# Patient Record
Sex: FEMALE | Race: WHITE | NOT HISPANIC OR LATINO | Employment: OTHER | URBAN - NONMETROPOLITAN AREA
[De-identification: names, ages, dates, MRNs, and addresses within clinical notes are randomized per-mention and may not be internally consistent; named-entity substitution may affect disease eponyms.]

---

## 2017-01-30 ENCOUNTER — OFFICE VISIT (OUTPATIENT)
Dept: PAIN MEDICINE | Facility: CLINIC | Age: 52
End: 2017-01-30

## 2017-01-30 VITALS
HEIGHT: 64 IN | WEIGHT: 220.6 LBS | DIASTOLIC BLOOD PRESSURE: 80 MMHG | SYSTOLIC BLOOD PRESSURE: 140 MMHG | BODY MASS INDEX: 37.66 KG/M2

## 2017-01-30 DIAGNOSIS — M47.817 LUMBOSACRAL SPONDYLOSIS WITHOUT MYELOPATHY: Primary | ICD-10-CM

## 2017-01-30 DIAGNOSIS — M79.18 MYOFASCIAL PAIN: ICD-10-CM

## 2017-01-30 DIAGNOSIS — M54.16 LUMBAR RADICULOPATHY: ICD-10-CM

## 2017-01-30 PROCEDURE — 99213 OFFICE O/P EST LOW 20 MIN: CPT | Performed by: PAIN MEDICINE

## 2017-01-30 RX ORDER — GABAPENTIN 300 MG/1
300 CAPSULE ORAL 3 TIMES DAILY
Qty: 90 CAPSULE | Refills: 2 | Status: SHIPPED | OUTPATIENT
Start: 2017-01-30 | End: 2019-06-11

## 2017-01-30 RX ORDER — MELOXICAM 15 MG/1
15 TABLET ORAL DAILY
Qty: 30 TABLET | Refills: 2 | Status: SHIPPED | OUTPATIENT
Start: 2017-01-30 | End: 2017-03-01

## 2017-01-30 NOTE — PROGRESS NOTES
Kalyn Gilliland is a 51 y.o. female.   1965    HPI:   Location: lower back and right leg  Quality: aching and dull  Severity: 3-4/10  Timing: constant  Alleviating: pain medication  Aggravating: increased activity    Pt missed appt due to weather.  She has weaned herself off of opioid.  She determined it was effective, but she feels better without.  Taking ibuprofen 800mg q8h.      The following portions of the patient's history were reviewed by me and updated as appropriate: allergies, current medications, past family history, past medical history, past social history, past surgical history and problem list.    Past Medical History   Diagnosis Date   • Arthropathy of lumbar facet joint    • Cervical spondylosis without myelopathy    • Chronic cholecystitis    • Long term use of drug    • Lumbosacral radiculitis    • Myofascial pain    • Sebaceous cyst    • Trochanteric bursitis        Social History     Social History   • Marital status: Single     Spouse name: N/A   • Number of children: N/A   • Years of education: N/A     Occupational History   • Not on file.     Social History Main Topics   • Smoking status: Current Every Day Smoker   • Smokeless tobacco: Not on file   • Alcohol use No   • Drug use: Not on file   • Sexual activity: Not on file     Other Topics Concern   • Not on file     Social History Narrative       Family History   Problem Relation Age of Onset   • Cancer Other    • Diabetes Other    • Heart disease Other    • Hyperlipidemia Other    • Hypertension Other    • Thyroid disease Other    • Other Other      Headaches         Current Outpatient Prescriptions:   •  citalopram (CeleXA) 40 MG tablet, Take 40 mg by mouth daily., Disp: , Rfl:   •  gabapentin (NEURONTIN) 300 MG capsule, Take 1 capsule by mouth 3 (Three) Times a Day., Disp: 180 capsule, Rfl: 2  •  guaiFENesin (MUCINEX) 600 MG 12 hr tablet, Take 600 mg by mouth daily., Disp: , Rfl:   •  hydrochlorothiazide (MICROZIDE) 12.5 MG  capsule, Take 12.5 mg by mouth daily., Disp: , Rfl:   •  IBUPROFEN IB PO, Take 1 tablet by mouth 3 (three) times a day as needed., Disp: , Rfl:   •  lisinopril (PRINIVIL,ZESTRIL) 20 MG tablet, Take 20 mg by mouth daily., Disp: , Rfl:   •  loratadine (CLARITIN) 10 MG tablet, Take 10 mg by mouth daily., Disp: , Rfl:   •  morphine (MS CONTIN) 15 MG 12 hr tablet, Take 15 mg by mouth every 12 (twelve) hours., Disp: , Rfl:   •  morphine (MS CONTIN) 15 MG 12 hr tablet, Take 1 tablet by mouth 2 (two) times a day., Disp: 60 tablet, Rfl: 0  •  morphine (MS CONTIN) 15 MG 12 hr tablet, Take 1 tablet by mouth 2 (two) times a day., Disp: 60 tablet, Rfl: 0  •  omeprazole (PRILOSEC) 40 MG capsule, Take 40 mg by mouth daily., Disp: , Rfl:   •  amphetamine-dextroamphetamine (ADDERALL) 10 MG tablet, Take 10 mg by mouth daily., Disp: , Rfl:     Allergies   Allergen Reactions   • Erythromycin Base Coated [Erythromycin]          Review of Systems   Musculoskeletal: Positive for back pain.        R.leg       10 system review of systems was reviewed and negative except for above.    Physical Exam   Constitutional: She appears well-developed and well-nourished. No distress.   Musculoskeletal:        Cervical back: She exhibits decreased range of motion (bilateral facet loading.).        Lumbar back: She exhibits decreased range of motion (ext limited to <5 deg with facet loading) and tenderness.   Neurological: She is alert.   Psychiatric: She has a normal mood and affect. Her behavior is normal. Judgment normal.       Kalyn was seen today for back pain and extremity pain.    Diagnoses and all orders for this visit:    Lumbosacral spondylosis without myelopathy  -     IR Facet Block Lumbar Sacral Spine First Level; Future  -     IR Facet Block Lumbar Sacral Spine Second Level; Future    Lumbar radiculopathy    Myofascial pain        Medication: Change ibuprofen to mobic. Refill gabapentin.    Interventional:  I have discussed the risks and  benefits of the procedure with the patient.  and Patient is a candidate for radiofrequency ablation if the planned procedure is diagnostic.    Rehab: none at this time. Pt has difficulty making PT appts due to living far from the appts.     Behavioral: No aberrant behavior noted. [facility] Report #[id number]  was reviewed and is consistent with stated history    Urine drug screen None at this time          This document has been electronically signed by Ramón Booker MD on January 30, 2017 10:59 AM

## 2017-02-16 ENCOUNTER — HOSPITAL ENCOUNTER (OUTPATIENT)
Dept: INTERVENTIONAL RADIOLOGY/VASCULAR | Facility: HOSPITAL | Age: 52
Discharge: HOME | End: 2017-02-16
Attending: PAIN MEDICINE

## 2017-02-16 ENCOUNTER — HOSPITAL ENCOUNTER (OUTPATIENT)
Dept: INTERVENTIONAL RADIOLOGY/VASCULAR | Facility: HOSPITAL | Age: 52
Discharge: HOME | End: 2017-02-16
Attending: PAIN MEDICINE | Admitting: PAIN MEDICINE

## 2017-02-16 VITALS
DIASTOLIC BLOOD PRESSURE: 76 MMHG | RESPIRATION RATE: 20 BRPM | OXYGEN SATURATION: 96 % | SYSTOLIC BLOOD PRESSURE: 153 MMHG | HEART RATE: 78 BPM

## 2017-02-16 DIAGNOSIS — M47.817 LUMBOSACRAL SPONDYLOSIS WITHOUT MYELOPATHY: ICD-10-CM

## 2017-02-16 PROCEDURE — 25010000002 METHYLPREDNISOLONE PER 40 MG: Performed by: PAIN MEDICINE

## 2017-02-16 PROCEDURE — 64494 INJ PARAVERT F JNT L/S 2 LEV: CPT | Performed by: PAIN MEDICINE

## 2017-02-16 PROCEDURE — 64493 INJ PARAVERT F JNT L/S 1 LEV: CPT | Performed by: PAIN MEDICINE

## 2017-02-16 RX ORDER — LIDOCAINE HYDROCHLORIDE 20 MG/ML
INJECTION, SOLUTION INFILTRATION; PERINEURAL
Status: COMPLETED | OUTPATIENT
Start: 2017-02-16 | End: 2017-02-16

## 2017-02-16 RX ORDER — METHYLPREDNISOLONE ACETATE 40 MG/ML
INJECTION, SUSPENSION INTRA-ARTICULAR; INTRALESIONAL; INTRAMUSCULAR; SOFT TISSUE
Status: COMPLETED | OUTPATIENT
Start: 2017-02-16 | End: 2017-02-16

## 2017-02-16 RX ORDER — BUPIVACAINE HYDROCHLORIDE 5 MG/ML
INJECTION, SOLUTION EPIDURAL; INTRACAUDAL
Status: COMPLETED | OUTPATIENT
Start: 2017-02-16 | End: 2017-02-16

## 2017-02-16 RX ADMIN — METHYLPREDNISOLONE ACETATE 40 MG: 40 INJECTION, SUSPENSION INTRA-ARTICULAR; INTRALESIONAL; INTRAMUSCULAR; SOFT TISSUE at 13:06

## 2017-02-16 RX ADMIN — BUPIVACAINE HYDROCHLORIDE 4 ML: 5 INJECTION, SOLUTION EPIDURAL; INTRACAUDAL; PERINEURAL at 13:05

## 2017-02-16 RX ADMIN — LIDOCAINE HYDROCHLORIDE 4 ML: 20 INJECTION, SOLUTION INFILTRATION; PERINEURAL at 13:04

## 2017-03-28 ENCOUNTER — OFFICE VISIT (OUTPATIENT)
Dept: PAIN MEDICINE | Facility: CLINIC | Age: 52
End: 2017-03-28

## 2017-03-28 VITALS
BODY MASS INDEX: 37.56 KG/M2 | WEIGHT: 220 LBS | HEIGHT: 64 IN | DIASTOLIC BLOOD PRESSURE: 90 MMHG | SYSTOLIC BLOOD PRESSURE: 138 MMHG

## 2017-03-28 DIAGNOSIS — M79.18 MYOFASCIAL PAIN: ICD-10-CM

## 2017-03-28 DIAGNOSIS — M47.817 LUMBOSACRAL SPONDYLOSIS WITHOUT MYELOPATHY: Primary | ICD-10-CM

## 2017-03-28 DIAGNOSIS — M54.16 LUMBAR RADICULOPATHY: ICD-10-CM

## 2017-03-28 PROCEDURE — 99213 OFFICE O/P EST LOW 20 MIN: CPT | Performed by: PAIN MEDICINE

## 2017-03-28 RX ORDER — MELOXICAM 15 MG/1
15 TABLET ORAL DAILY
Qty: 30 TABLET | Refills: 3 | Status: SHIPPED | OUTPATIENT
Start: 2017-03-28 | End: 2017-07-21 | Stop reason: SDUPTHER

## 2017-03-28 RX ORDER — MELOXICAM 15 MG/1
15 TABLET ORAL DAILY
COMMUNITY
End: 2017-03-28 | Stop reason: SDUPTHER

## 2017-03-28 NOTE — PROGRESS NOTES
Kalyn Gilliland is a 52 y.o. female.   1965    HPI:   Location: lower back and right leg  Quality: aching and dull  Severity: 4/10  Timing: constant  Alleviating: pain medication  Aggravating: increased activity      lmbb's did not provide much relief.  Would like to try something else.    The following portions of the patient's history were reviewed by me and updated as appropriate: allergies, current medications, past family history, past medical history, past social history, past surgical history and problem list.    Past Medical History:   Diagnosis Date   • Arthropathy of lumbar facet joint    • Cervical spondylosis without myelopathy    • Chronic cholecystitis    • Long term use of drug    • Lumbosacral radiculitis    • Myofascial pain    • Sebaceous cyst    • Trochanteric bursitis        Social History     Social History   • Marital status: Single     Spouse name: N/A   • Number of children: N/A   • Years of education: N/A     Occupational History   • Not on file.     Social History Main Topics   • Smoking status: Current Every Day Smoker     Types: Cigarettes   • Smokeless tobacco: Not on file   • Alcohol use No   • Drug use: Not on file   • Sexual activity: Not on file     Other Topics Concern   • Not on file     Social History Narrative       Family History   Problem Relation Age of Onset   • Cancer Other    • Diabetes Other    • Heart disease Other    • Hyperlipidemia Other    • Hypertension Other    • Thyroid disease Other    • Other Other      Headaches         Current Outpatient Prescriptions:   •  citalopram (CeleXA) 40 MG tablet, Take 40 mg by mouth daily., Disp: , Rfl:   •  gabapentin (NEURONTIN) 300 MG capsule, Take 1 capsule by mouth 3 (Three) Times a Day., Disp: 90 capsule, Rfl: 2  •  hydrochlorothiazide (MICROZIDE) 12.5 MG capsule, Take 12.5 mg by mouth daily., Disp: , Rfl:   •  lisinopril (PRINIVIL,ZESTRIL) 20 MG tablet, Take 20 mg by mouth daily., Disp: , Rfl:   •  loratadine  (CLARITIN) 10 MG tablet, Take 10 mg by mouth daily., Disp: , Rfl:   •  meloxicam (MOBIC) 15 MG tablet, Take 1 tablet by mouth Daily., Disp: 30 tablet, Rfl: 3    Allergies   Allergen Reactions   • Erythromycin Base Coated [Erythromycin]          Review of Systems   Musculoskeletal: Positive for back pain (lower).        Right leg pain     10 system review of systems was reviewed and negative except for above.    Physical Exam   Constitutional: She appears well-developed and well-nourished. No distress.   Neurological: She is alert. She has normal strength.   Mod slr on right   Psychiatric: She has a normal mood and affect. Her behavior is normal. Judgment normal.       Kalyn was seen today for back pain and leg pain.    Diagnoses and all orders for this visit:    Lumbosacral spondylosis without myelopathy    Lumbar radiculopathy  -     IR epidural block injection lumbar spine; Future    Myofascial pain    Other orders  -     meloxicam (MOBIC) 15 MG tablet; Take 1 tablet by mouth Daily.        Medication: None at this time .  Mobic helps.  Will continue.    Interventional:  I have discussed the risks and benefits of the procedure with the patient.  caudal skip    Rehab: none at this time    Behavioral: No aberrant behavior noted. [facility] Report #[id number]  was reviewed and is consistent with stated history    Urine drug screen None at this time          This document has been electronically signed by Ramón Booker MD on March 28, 2017 11:56 AM

## 2017-04-17 ENCOUNTER — APPOINTMENT (OUTPATIENT)
Dept: INTERVENTIONAL RADIOLOGY/VASCULAR | Facility: HOSPITAL | Age: 52
End: 2017-04-17
Attending: PAIN MEDICINE

## 2017-06-26 ENCOUNTER — OFFICE VISIT (OUTPATIENT)
Dept: PAIN MEDICINE | Facility: CLINIC | Age: 52
End: 2017-06-26

## 2017-06-26 ENCOUNTER — APPOINTMENT (OUTPATIENT)
Dept: LAB | Facility: HOSPITAL | Age: 52
End: 2017-06-26

## 2017-06-26 VITALS
WEIGHT: 224.9 LBS | HEIGHT: 64 IN | SYSTOLIC BLOOD PRESSURE: 150 MMHG | BODY MASS INDEX: 38.39 KG/M2 | DIASTOLIC BLOOD PRESSURE: 90 MMHG

## 2017-06-26 DIAGNOSIS — M47.812 CERVICAL SPONDYLOSIS WITHOUT MYELOPATHY: ICD-10-CM

## 2017-06-26 DIAGNOSIS — Z98.890 HISTORY OF BACK SURGERY: ICD-10-CM

## 2017-06-26 DIAGNOSIS — M51.36 DDD (DEGENERATIVE DISC DISEASE), LUMBAR: Primary | ICD-10-CM

## 2017-06-26 DIAGNOSIS — Z79.899 HIGH RISK MEDICATIONS (NOT ANTICOAGULANTS) LONG-TERM USE: ICD-10-CM

## 2017-06-26 DIAGNOSIS — M79.18 MYOFACIAL MUSCLE PAIN: ICD-10-CM

## 2017-06-26 DIAGNOSIS — M54.2 CERVICALGIA: ICD-10-CM

## 2017-06-26 DIAGNOSIS — M47.817 LUMBOSACRAL SPONDYLOSIS WITHOUT MYELOPATHY: ICD-10-CM

## 2017-06-26 PROCEDURE — 99214 OFFICE O/P EST MOD 30 MIN: CPT | Performed by: PAIN MEDICINE

## 2017-06-26 PROCEDURE — G0481 DRUG TEST DEF 8-14 CLASSES: HCPCS | Performed by: NURSE PRACTITIONER

## 2017-06-26 PROCEDURE — 80307 DRUG TEST PRSMV CHEM ANLYZR: CPT | Performed by: NURSE PRACTITIONER

## 2017-06-26 RX ORDER — CYCLOBENZAPRINE HCL 10 MG
10 TABLET ORAL DAILY
Qty: 21 TABLET | Refills: 1 | Status: SHIPPED | OUTPATIENT
Start: 2017-06-26 | End: 2017-07-17

## 2017-06-26 NOTE — PROGRESS NOTES
"Kalyn Gilliland is a 52 y.o. female.   1965    HPI:   Location: lower back and right leg  Quality: aching and dull  Severity: 4/10  Timing: constant  Alleviating: pain medication  Aggravating: increased activity      Pt with return visit since March 2017. Pt with history of lower back pain with right leg pain. Pt had Dr. Roman ([facility]) Diskectomy 2014. Pt had seen pain management in past, opiate and injections. Pt had \"weened\" herself opiate medications 1/2017, missed appt and followed up with Dr. AQUILINO whiting. LMBB x 1 with less than 50% effective for 1 week. Was put on Mobic. Pt could not cover the LESI ordered in March. Pt has had Norco in past.       The following portions of the patient's history were reviewed by me and updated as appropriate: allergies, current medications, past family history, past medical history, past social history, past surgical history and problem list.    Past Medical History:   Diagnosis Date   • Arthropathy of lumbar facet joint    • Cervical spondylosis without myelopathy    • Chronic cholecystitis    • Long term use of drug    • Lumbosacral radiculitis    • Myofascial pain    • Sebaceous cyst    • Trochanteric bursitis        Social History     Social History   • Marital status: Single     Spouse name: N/A   • Number of children: N/A   • Years of education: N/A     Occupational History   • Not on file.     Social History Main Topics   • Smoking status: Current Every Day Smoker     Types: Cigarettes   • Smokeless tobacco: Never Used   • Alcohol use No   • Drug use: Not on file   • Sexual activity: Not on file     Other Topics Concern   • Not on file     Social History Narrative       Family History   Problem Relation Age of Onset   • Cancer Other    • Diabetes Other    • Heart disease Other    • Hyperlipidemia Other    • Hypertension Other    • Thyroid disease Other    • Other Other      Headaches         Current Outpatient Prescriptions:   •  citalopram (CeleXA) 40 MG " tablet, Take 40 mg by mouth daily., Disp: , Rfl:   •  gabapentin (NEURONTIN) 300 MG capsule, Take 1 capsule by mouth 3 (Three) Times a Day., Disp: 90 capsule, Rfl: 2  •  hydrochlorothiazide (MICROZIDE) 12.5 MG capsule, Take 12.5 mg by mouth daily., Disp: , Rfl:   •  lisinopril (PRINIVIL,ZESTRIL) 20 MG tablet, Take 20 mg by mouth daily., Disp: , Rfl:   •  loratadine (CLARITIN) 10 MG tablet, Take 10 mg by mouth daily., Disp: , Rfl:   •  meloxicam (MOBIC) 15 MG tablet, Take 1 tablet by mouth Daily., Disp: 30 tablet, Rfl: 3  •  cyclobenzaprine (FLEXERIL) 10 MG tablet, Take 1 tablet by mouth Daily for 21 days., Disp: 21 tablet, Rfl: 1    Allergies   Allergen Reactions   • Erythromycin Base Coated [Erythromycin]        Review of Systems   Musculoskeletal: Positive for back pain.        R.leg     All other systems reviewed and are negative.    All systems reviewed and negative except for above.    Physical Exam   Constitutional: She is oriented to person, place, and time. She appears well-developed and well-nourished. No distress.   Neck: Spinous process tenderness and muscular tenderness (  BPSM TTP) present. Decreased range of motion present.   Cardiovascular: Normal rate.    Pulmonary/Chest: Effort normal and breath sounds normal. No respiratory distress.   Abdominal: Soft.   Musculoskeletal:        Cervical back: She exhibits decreased range of motion ( limited flex and ext with [place] ) and tenderness (BPSM TTP ).        Lumbar back: She exhibits decreased range of motion (Flex 60 deg and 10 deg ext with [place]  ) and tenderness ( midline and TTP ).   Neurological: She is alert and oriented to person, place, and time. Gait ( cane) abnormal. Coordination normal.   Reflex Scores:       Tricep reflexes are 2+ on the right side and 2+ on the left side.       Bicep reflexes are 2+ on the right side and 2+ on the left side.       Brachioradialis reflexes are 2+ on the right side and 2+ on the left side.       Patellar  reflexes are 2+ on the right side and 2+ on the left side.       Achilles reflexes are 1+ on the right side and 1+ on the left side.  Skin: Skin is warm and dry.   Psychiatric: She has a normal mood and affect. Her behavior is normal. Judgment normal. She expresses no homicidal and no suicidal ideation.   Nursing note and vitals reviewed.      Kalyn was seen today for back pain and pain.    Diagnoses and all orders for this visit:    DDD (degenerative disc disease), lumbar    Lumbosacral spondylosis without myelopathy    History of back surgery    Cervicalgia    Cervical spondylosis without myelopathy    High risk medications (not anticoagulants) long-term use  -     ToxASSURE Select 13 (MW)    Myofacial muscle pain    Other orders  -     cyclobenzaprine (FLEXERIL) 10 MG tablet; Take 1 tablet by mouth Daily for 21 days.        Medication: Patient reports no negative side effects, Patient reports appropriate usage and storage habits and Patient's opioid provides enough reflief to be more active and perform activities of daily living with less discomfort. Norco 7.5mg q day. Discussed case with Real Booker, opiate medication refilled ×2 hand written scripts. Refilled flexeril 10mg q daily- NO SE noted.       Interventional: We will order norco 7.5mg q day (no SE effects reported via patient) and Flexeril 10mg q hs.  Pt could not cover the 'LESI ordered in march'. Pt wishes to discuss opiate medications with this visit.     Rehab: walks with cane for mobility.     Behavioral: No aberrant behavior noted. [facility] Report # [id number]  was reviewed and is consistent with stated history    Urine drug screen Ordered today to test for drugs of abuse and prescribed medications           This document has been electronically signed by YEIMY Mccullough on June 26, 2017 11:13 AM          This document has been electronically signed by YEIMY Mccullough on June 26, 2017 11:13 AM

## 2017-07-04 LAB — CONV REPORT SUMMARY: NORMAL

## 2017-07-21 RX ORDER — MELOXICAM 15 MG/1
15 TABLET ORAL DAILY
Qty: 30 TABLET | Refills: 3 | Status: SHIPPED | OUTPATIENT
Start: 2017-07-21 | End: 2017-07-26 | Stop reason: SDUPTHER

## 2017-07-26 RX ORDER — MELOXICAM 15 MG/1
15 TABLET ORAL DAILY
Qty: 90 TABLET | Refills: 0 | Status: SHIPPED | OUTPATIENT
Start: 2017-07-26 | End: 2019-03-28

## 2017-09-08 ENCOUNTER — OFFICE VISIT (OUTPATIENT)
Dept: PAIN MEDICINE | Facility: CLINIC | Age: 52
End: 2017-09-08

## 2017-09-08 VITALS
BODY MASS INDEX: 38 KG/M2 | HEIGHT: 64 IN | WEIGHT: 222.6 LBS | DIASTOLIC BLOOD PRESSURE: 82 MMHG | SYSTOLIC BLOOD PRESSURE: 130 MMHG

## 2017-09-08 DIAGNOSIS — E66.01 MORBID OBESITY DUE TO EXCESS CALORIES (HCC): ICD-10-CM

## 2017-09-08 DIAGNOSIS — M51.36 DDD (DEGENERATIVE DISC DISEASE), LUMBAR: Primary | ICD-10-CM

## 2017-09-08 DIAGNOSIS — M47.812 CERVICAL SPONDYLOSIS WITHOUT MYELOPATHY: ICD-10-CM

## 2017-09-08 DIAGNOSIS — M54.2 CERVICALGIA: ICD-10-CM

## 2017-09-08 DIAGNOSIS — M47.817 LUMBOSACRAL SPONDYLOSIS WITHOUT MYELOPATHY: ICD-10-CM

## 2017-09-08 DIAGNOSIS — M79.18 MYOFACIAL MUSCLE PAIN: ICD-10-CM

## 2017-09-08 PROCEDURE — 99214 OFFICE O/P EST MOD 30 MIN: CPT | Performed by: PAIN MEDICINE

## 2017-09-08 NOTE — PROGRESS NOTES
Kalyn Gilliland is a 52 y.o. female.   1965    HPI:   Location: lower back and right leg  Quality: aching  Severity: 4/10  Timing: constant  Alleviating: pain medication  Aggravating: increased activity    Pt with no change in health status. Pt with chronic neck and back pain. Pt reports using motrin currently instead of mobic for GI issues. This has been fully explained to the patient NSAIDS side effects, who indicates understanding. Opiate medications provides enough relief for daily activity and ambulation. NO SE. Pt happy with pain management visit and regimen.         The following portions of the patient's history were reviewed by me and updated as appropriate: allergies, current medications, past family history, past medical history, past social history, past surgical history and problem list.    Past Medical History:   Diagnosis Date   • Arthropathy of lumbar facet joint    • Cervical spondylosis without myelopathy    • Chronic cholecystitis    • Long term use of drug    • Lumbosacral radiculitis    • Myofascial pain    • Sebaceous cyst    • Trochanteric bursitis        Social History     Social History   • Marital status: Single     Spouse name: N/A   • Number of children: N/A   • Years of education: N/A     Occupational History   • Not on file.     Social History Main Topics   • Smoking status: Current Every Day Smoker     Types: Cigarettes   • Smokeless tobacco: Never Used   • Alcohol use No   • Drug use: No   • Sexual activity: Defer     Other Topics Concern   • Not on file     Social History Narrative       Family History   Problem Relation Age of Onset   • Cancer Other    • Diabetes Other    • Heart disease Other    • Hyperlipidemia Other    • Hypertension Other    • Thyroid disease Other    • Other Other      Headaches         Current Outpatient Prescriptions:   •  citalopram (CeleXA) 40 MG tablet, Take 40 mg by mouth daily., Disp: , Rfl:   •  gabapentin (NEURONTIN) 300 MG capsule, Take 1  capsule by mouth 3 (Three) Times a Day., Disp: 90 capsule, Rfl: 2  •  hydrochlorothiazide (MICROZIDE) 12.5 MG capsule, Take 12.5 mg by mouth daily., Disp: , Rfl:   •  lisinopril (PRINIVIL,ZESTRIL) 20 MG tablet, Take 20 mg by mouth daily., Disp: , Rfl:   •  loratadine (CLARITIN) 10 MG tablet, Take 10 mg by mouth daily., Disp: , Rfl:   •  meloxicam (MOBIC) 15 MG tablet, Take 1 tablet by mouth Daily., Disp: 90 tablet, Rfl: 0    Allergies   Allergen Reactions   • Erythromycin Base Coated [Erythromycin]        Review of Systems   Musculoskeletal: Positive for back pain (lower).        Right leg pain     All other systems reviewed and are negative.    All systems reviewed and negative except for above.    Physical Exam   Constitutional: She is oriented to person, place, and time. She appears well-developed and well-nourished. No distress.   Neck: Spinous process tenderness and muscular tenderness (  BPSM TTP) present. Decreased range of motion present.   Cardiovascular: Normal rate.    Pulmonary/Chest: Effort normal. No respiratory distress.   Abdominal: Soft.   Musculoskeletal:        Cervical back: She exhibits decreased range of motion (ext wtih cervical facet loading) and tenderness (BPSM TTP  ).        Lumbar back: She exhibits decreased range of motion (Flex < 45 deg and 15 deg with right> left facet loading ) and tenderness ( midline and right si joint TTp).   Neurological: She is alert and oriented to person, place, and time. Gait ( cane ) abnormal. Coordination normal.   Reflex Scores:       Tricep reflexes are 2+ on the right side and 2+ on the left side.       Bicep reflexes are 2+ on the right side and 2+ on the left side.       Brachioradialis reflexes are 2+ on the right side and 2+ on the left side.       Patellar reflexes are 2+ on the right side and 2+ on the left side.       Achilles reflexes are 1+ on the right side and 1+ on the left side.  Skin: Skin is warm and dry.   Psychiatric: She has a normal  mood and affect. Her behavior is normal. Judgment normal. She expresses no homicidal and no suicidal ideation. She expresses no suicidal plans and no homicidal plans.   Nursing note and vitals reviewed.      Kalyn was seen today for back pain and leg pain.    Diagnoses and all orders for this visit:    DDD (degenerative disc disease), lumbar    Lumbosacral spondylosis without myelopathy    Cervicalgia    Cervical spondylosis without myelopathy    Myofacial muscle pain    Morbid obesity due to excess calories        Medication: Patient reports no negative side effects, Patient reports appropriate usage and storage habits and Patient's opioid provides enough reflief to be more active and perform activities of daily living with less discomfort. Norco 7.5mg q day. Discussed case with Real Booker, opiate medication refilled ×2 hand written scripts. I have refilled Flexeril. Pt decides to use OTC motrin until next visit- Mobic refill occ.       Interventional:  Lumbar injections cost prohibitive at this moment. I have discussed weight control and issues with current exam, current weight 222lb. OLY and any neurological impairments discussed with patient that may need of emergency evaluation.      Last 2 weights    09/08/17  1223   Weight: 222 lb 9.6 oz (101 kg)         Rehab: Walks with cane.     Behavioral: No aberrant behavior noted. [facility] Report # [id number]  was reviewed and is consistent with stated history    Urine drug screen Reviewed from last visit and is appropriate          This document has been electronically signed by YEIMY Mccullough on September 8, 2017 1:06 PM          This document has been electronically signed by YEIMY Mccullough on September 8, 2017 1:06 PM

## 2017-11-07 ENCOUNTER — APPOINTMENT (OUTPATIENT)
Dept: LAB | Facility: HOSPITAL | Age: 52
End: 2017-11-07

## 2017-11-07 ENCOUNTER — OFFICE VISIT (OUTPATIENT)
Dept: PAIN MEDICINE | Facility: CLINIC | Age: 52
End: 2017-11-07

## 2017-11-07 VITALS
SYSTOLIC BLOOD PRESSURE: 130 MMHG | DIASTOLIC BLOOD PRESSURE: 88 MMHG | HEIGHT: 64 IN | BODY MASS INDEX: 37.47 KG/M2 | WEIGHT: 219.5 LBS

## 2017-11-07 DIAGNOSIS — M79.18 MYOFACIAL MUSCLE PAIN: ICD-10-CM

## 2017-11-07 DIAGNOSIS — Z79.899 HIGH RISK MEDICATIONS (NOT ANTICOAGULANTS) LONG-TERM USE: ICD-10-CM

## 2017-11-07 DIAGNOSIS — M54.2 CERVICALGIA: ICD-10-CM

## 2017-11-07 DIAGNOSIS — M47.817 LUMBOSACRAL SPONDYLOSIS WITHOUT MYELOPATHY: ICD-10-CM

## 2017-11-07 DIAGNOSIS — E66.01 MORBID OBESITY DUE TO EXCESS CALORIES (HCC): ICD-10-CM

## 2017-11-07 DIAGNOSIS — M51.36 DDD (DEGENERATIVE DISC DISEASE), LUMBAR: Primary | ICD-10-CM

## 2017-11-07 DIAGNOSIS — M47.812 CERVICAL SPONDYLOSIS WITHOUT MYELOPATHY: ICD-10-CM

## 2017-11-07 PROCEDURE — G0481 DRUG TEST DEF 8-14 CLASSES: HCPCS | Performed by: NURSE PRACTITIONER

## 2017-11-07 PROCEDURE — 99214 OFFICE O/P EST MOD 30 MIN: CPT | Performed by: PAIN MEDICINE

## 2017-11-07 PROCEDURE — 80307 DRUG TEST PRSMV CHEM ANLYZR: CPT | Performed by: NURSE PRACTITIONER

## 2017-11-07 NOTE — PROGRESS NOTES
"Kalyn Gilliland is a 52 y.o. female.   1965    HPI:   Location: lower back and right leg  Quality: aching  Severity: 4/10  Timing: constant  Alleviating: pain medication  Aggravating: increased activity    Pt reports \"has stopped using Nsaids personal choice- no gi issues- just want to give my system a break\"- cold weather causing more pain. Inquires about increasing pain during day- May increase dosage bid for temporary- but understands this is not long term. Opiate medications provides just enough relief for daily activity and ambulation. NO SE. Pt happy with pain management visit and regimen.         The following portions of the patient's history were reviewed by me and updated as appropriate: allergies, current medications, past family history, past medical history, past social history, past surgical history and problem list.    Past Medical History:   Diagnosis Date   • Arthropathy of lumbar facet joint    • Cervical spondylosis without myelopathy    • Chronic cholecystitis    • Long term use of drug    • Lumbosacral radiculitis    • Myofascial pain    • Sebaceous cyst    • Trochanteric bursitis        Social History     Social History   • Marital status: Single     Spouse name: N/A   • Number of children: N/A   • Years of education: N/A     Occupational History   • Not on file.     Social History Main Topics   • Smoking status: Current Every Day Smoker     Types: Cigarettes   • Smokeless tobacco: Never Used   • Alcohol use No   • Drug use: No   • Sexual activity: Defer     Other Topics Concern   • Not on file     Social History Narrative       Family History   Problem Relation Age of Onset   • Cancer Other    • Diabetes Other    • Heart disease Other    • Hyperlipidemia Other    • Hypertension Other    • Thyroid disease Other    • Other Other      Headaches         Current Outpatient Prescriptions:   •  citalopram (CeleXA) 40 MG tablet, Take 40 mg by mouth daily., Disp: , Rfl:   •  gabapentin " (NEURONTIN) 300 MG capsule, Take 1 capsule by mouth 3 (Three) Times a Day., Disp: 90 capsule, Rfl: 2  •  hydrochlorothiazide (MICROZIDE) 12.5 MG capsule, Take 12.5 mg by mouth daily., Disp: , Rfl:   •  lisinopril (PRINIVIL,ZESTRIL) 20 MG tablet, Take 20 mg by mouth daily., Disp: , Rfl:   •  loratadine (CLARITIN) 10 MG tablet, Take 10 mg by mouth daily., Disp: , Rfl:   •  meloxicam (MOBIC) 15 MG tablet, Take 1 tablet by mouth Daily., Disp: 90 tablet, Rfl: 0    Allergies   Allergen Reactions   • Erythromycin Base Coated [Erythromycin]        Review of Systems   Musculoskeletal: Positive for back pain (lower).        Right leg pain     All other systems reviewed and are negative.    All systems reviewed and negative except for above.    Physical Exam   Constitutional: She is oriented to person, place, and time. She appears well-developed and well-nourished. No distress.   Neck: Spinous process tenderness and muscular tenderness (  BPSM TTP) present. Decreased range of motion present.   Cardiovascular: Normal rate.    Pulmonary/Chest: Effort normal. No respiratory distress.   Musculoskeletal:        Cervical back: She exhibits decreased range of motion (mild ext limited with facet loading) and tenderness (muscle asher cervical ttp).        Lumbar back: She exhibits decreased range of motion (Flex < 60 deg and 15 deg ext wtih mild left facet loading  ) and tenderness (midline and left si joint).   Neurological: She is alert and oriented to person, place, and time. She displays no tremor. She displays no seizure activity. Gait ( cane ) abnormal. Coordination normal.   Reflex Scores:       Patellar reflexes are 2+ on the right side and 2+ on the left side.       Achilles reflexes are 1+ on the right side and 1+ on the left side.  Skin: Skin is warm and dry. No pallor.   Psychiatric: She has a normal mood and affect. Her behavior is normal. Judgment normal. She expresses no homicidal and no suicidal ideation. She expresses no  suicidal plans and no homicidal plans.   Nursing note and vitals reviewed.      Kalyn was seen today for back pain and leg pain.    Diagnoses and all orders for this visit:    DDD (degenerative disc disease), lumbar    Lumbosacral spondylosis without myelopathy    Cervical spondylosis without myelopathy    Cervicalgia    Myofacial muscle pain    Morbid obesity due to excess calories    High risk medications (not anticoagulants) long-term use  -     [employer] Select 13 (MW) - Urine, Clean Catch        Medication: Patient reports no negative side effects, Patient reports appropriate usage and storage habits and Patient's opioid provides enough relief to be more active and perform activities of daily living with less discomfort. will increase temporary norco 7.5mg bid. Discussed case with Real Booker, opiate medication refilled ×2 hand written scripts.      Interventional: OLY and any neurological impairments discussed with patient that may need of emergency evaluation.    Rehab: walking with cane for mobility.     Behavioral: No aberrant behavior noted. [facility] Report # [id number]  was reviewed and is consistent with stated history    Urine drug screen Ordered today to test for drugs of abuse and prescribed medications          This document has been electronically signed by YEIMY Mccullough on November 7, 2017 10:35 AM          This document has been electronically signed by YEIMY Mccullough on November 7, 2017 10:35 AM

## 2017-11-12 LAB — CONV REPORT SUMMARY: NORMAL

## 2017-12-20 ENCOUNTER — OFFICE VISIT (OUTPATIENT)
Dept: PAIN MEDICINE | Facility: CLINIC | Age: 52
End: 2017-12-20

## 2017-12-20 VITALS
WEIGHT: 226.3 LBS | SYSTOLIC BLOOD PRESSURE: 128 MMHG | BODY MASS INDEX: 38.64 KG/M2 | HEIGHT: 64 IN | DIASTOLIC BLOOD PRESSURE: 88 MMHG

## 2017-12-20 DIAGNOSIS — M54.2 CERVICALGIA: ICD-10-CM

## 2017-12-20 DIAGNOSIS — M54.16 LUMBAR RADICULOPATHY: ICD-10-CM

## 2017-12-20 DIAGNOSIS — M51.36 DDD (DEGENERATIVE DISC DISEASE), LUMBAR: Primary | ICD-10-CM

## 2017-12-20 DIAGNOSIS — E66.01 MORBIDLY OBESE (HCC): ICD-10-CM

## 2017-12-20 DIAGNOSIS — Z79.899 HIGH RISK MEDICATIONS (NOT ANTICOAGULANTS) LONG-TERM USE: ICD-10-CM

## 2017-12-20 PROCEDURE — 99214 OFFICE O/P EST MOD 30 MIN: CPT | Performed by: PAIN MEDICINE

## 2017-12-20 RX ORDER — HYDROCODONE BITARTRATE AND ACETAMINOPHEN 7.5; 325 MG/1; MG/1
1 TABLET ORAL 2 TIMES DAILY
Qty: 60 TABLET | Refills: 0 | Status: SHIPPED | OUTPATIENT
Start: 2017-12-20 | End: 2018-01-19

## 2017-12-20 NOTE — PROGRESS NOTES
Kalyn Gilliland is a 52 y.o. female.   1965    HPI:   Location: lower back and right leg  Quality: aching  Severity: 4/10  Timing: constant  Alleviating: pain medication  Aggravating: increased activity     Patient reports that the opioid medication still provides her good relief and allows more activity than she would have without the opioid medication.  She denies side effects.      The following portions of the patient's history were reviewed by me and updated as appropriate: allergies, current medications, past family history, past medical history, past social history, past surgical history and problem list.    Past Medical History:   Diagnosis Date   • Arthropathy of lumbar facet joint    • Cervical spondylosis without myelopathy    • Chronic cholecystitis    • Long term use of drug    • Lumbosacral radiculitis    • Myofascial pain    • Sebaceous cyst    • Trochanteric bursitis        Social History     Social History   • Marital status: Single     Spouse name: N/A   • Number of children: N/A   • Years of education: N/A     Occupational History   • Not on file.     Social History Main Topics   • Smoking status: Current Every Day Smoker     Types: Cigarettes   • Smokeless tobacco: Never Used   • Alcohol use No   • Drug use: No   • Sexual activity: Defer     Other Topics Concern   • Not on file     Social History Narrative       Family History   Problem Relation Age of Onset   • Cancer Other    • Diabetes Other    • Heart disease Other    • Hyperlipidemia Other    • Hypertension Other    • Thyroid disease Other    • Other Other      Headaches         Current Outpatient Prescriptions:   •  citalopram (CeleXA) 40 MG tablet, Take 40 mg by mouth daily., Disp: , Rfl:   •  gabapentin (NEURONTIN) 300 MG capsule, Take 1 capsule by mouth 3 (Three) Times a Day., Disp: 90 capsule, Rfl: 2  •  hydrochlorothiazide (MICROZIDE) 12.5 MG capsule, Take 12.5 mg by mouth daily., Disp: , Rfl:   •  lisinopril (PRINIVIL,ZESTRIL)  20 MG tablet, Take 20 mg by mouth daily., Disp: , Rfl:   •  loratadine (CLARITIN) 10 MG tablet, Take 10 mg by mouth daily., Disp: , Rfl:   •  meloxicam (MOBIC) 15 MG tablet, Take 1 tablet by mouth Daily., Disp: 90 tablet, Rfl: 0  •  HYDROcodone-acetaminophen (NORCO) 7.5-325 MG per tablet, Take 1 tablet by mouth 2 (Two) Times a Day for 30 days., Disp: 60 tablet, Rfl: 0    Allergies   Allergen Reactions   • Erythromycin Base Coated [Erythromycin]        Review of Systems   Musculoskeletal: Positive for back pain (lower).        Right leg pain     All other systems reviewed and are negative.    All systems reviewed and negative except for above.    Physical Exam   Constitutional: She appears well-developed and well-nourished. No distress.   Musculoskeletal:        Cervical back: She exhibits decreased range of motion (bilateral facet loading.).        Lumbar back: She exhibits decreased range of motion (ext limited to <5 deg with facet loading.  flexion to about 30 deg) and tenderness.   Neurological: She is alert.   Psychiatric: She has a normal mood and affect. Her behavior is normal. Judgment normal.       Kalyn was seen today for back pain and leg pain.    Diagnoses and all orders for this visit:    DDD (degenerative disc disease), lumbar    Lumbar radiculopathy    Cervicalgia    Morbidly obese    High risk medications (not anticoagulants) long-term use    Other orders  -     HYDROcodone-acetaminophen (NORCO) 7.5-325 MG per tablet; Take 1 tablet by mouth 2 (Two) Times a Day for 30 days.        Medication: Patient reports no negative side effects, Patient reports appropriate usage and storage habits, Patient's opioid provides enough relief to be more active and perform activities of daily living with less discomfort. and Refill opioid medication as above.   I explained that the pain clinic will be closing after the first of the year.  I let the pt know where I will be located.     Interventional: none at this time.   Candidate for follow up lmbb's.  Pt had issue with payment.      Rehab: Encouraged daily stretching and gentle rom exercises.    Behavioral: No aberrant behavior noted. [facility] Report #[id number]  was reviewed and is consistent with stated history    Urine drug screen Reviewed from last visit and is appropriate          This document has been electronically signed by Ramón Booker MD on December 20, 2017 10:32 AM

## 2018-03-08 ENCOUNTER — APPOINTMENT (OUTPATIENT)
Dept: ULTRASOUND IMAGING | Facility: HOSPITAL | Age: 53
End: 2018-03-08

## 2018-05-23 ENCOUNTER — TELEPHONE (OUTPATIENT)
Dept: GASTROENTEROLOGY | Facility: CLINIC | Age: 53
End: 2018-05-23

## 2018-05-23 NOTE — TELEPHONE ENCOUNTER
----- Message from Vivifred Valerio sent at 2018  3:02 PM EDT -----  Regarding: Returning call  Contact: [phone]   69 Pt returned call. Call her after 3:30 tomorrow   CALLED PATIENT LEFT MESSAGE TO CALL [phone]

## 2019-03-28 ENCOUNTER — OFFICE VISIT (OUTPATIENT)
Dept: GASTROENTEROLOGY | Facility: CLINIC | Age: 54
End: 2019-03-28

## 2019-03-28 VITALS
BODY MASS INDEX: 35 KG/M2 | HEIGHT: 64 IN | SYSTOLIC BLOOD PRESSURE: 120 MMHG | WEIGHT: 205.04 LBS | HEART RATE: 86 BPM | DIASTOLIC BLOOD PRESSURE: 62 MMHG

## 2019-03-28 DIAGNOSIS — Z12.11 ENCOUNTER FOR SCREENING FOR MALIGNANT NEOPLASM OF COLON: ICD-10-CM

## 2019-03-28 DIAGNOSIS — R10.13 EPIGASTRIC PAIN: Primary | ICD-10-CM

## 2019-03-28 PROCEDURE — 99214 OFFICE O/P EST MOD 30 MIN: CPT | Performed by: NURSE PRACTITIONER

## 2019-03-28 RX ORDER — OMEPRAZOLE 40 MG/1
CAPSULE, DELAYED RELEASE ORAL
COMMUNITY
Start: 2018-02-06 | End: 2019-07-02 | Stop reason: SDUPTHER

## 2019-03-28 RX ORDER — SUCRALFATE ORAL 1 G/10ML
1 SUSPENSION ORAL
Qty: 1260 ML | Refills: 3 | Status: SHIPPED | OUTPATIENT
Start: 2019-03-28 | End: 2019-04-18 | Stop reason: SDUPTHER

## 2019-03-28 RX ORDER — SODIUM, POTASSIUM,MAG SULFATES 17.5-3.13G
1 SOLUTION, RECONSTITUTED, ORAL ORAL EVERY 12 HOURS
Qty: 2 BOTTLE | Refills: 0 | Status: SHIPPED | OUTPATIENT
Start: 2019-03-28 | End: 2019-03-29 | Stop reason: ALTCHOICE

## 2019-03-28 RX ORDER — DEXTROSE AND SODIUM CHLORIDE 5; .45 G/100ML; G/100ML
30 INJECTION, SOLUTION INTRAVENOUS CONTINUOUS PRN
Status: CANCELLED | OUTPATIENT
Start: 2019-04-30

## 2019-03-28 NOTE — PROGRESS NOTES
Chief Complaint   Patient presents with   • Colon Cancer Screening     family hx colon cancer   • Abdominal Pain       Subjective    Kalyn Gilliland is a 54 y.o. female. she is here today for follow-up.  54-year-old female presents to discuss abdominal pain screening colonoscopy due to family history of colon cancer in maternal grandmother.  Reports abdominal pain is been ongoing for several years.  Been followed by pain management discontinued morphine and started on ibuprofen 800 mg twice daily for several years.  States she has upper abdominal pain anytime she eats and she has lost about 20 pounds in the last 3 months due to abdominal pain with eating.  Had her gallbladder out several years ago.  Denies any changes in her bowel habits reports occasional hemorrhoidal bleeding if she strains.  She has opioid-induced constipation due to chronic pain medication however controlled with dietary modifications and increase fiber.    Heartburn   She complains of abdominal pain, belching and heartburn (better with prilosec ). She reports no chest pain, no choking, no coughing, no dysphagia, no early satiety, no globus sensation, no hoarse voice, no nausea or no sore throat. The problem occurs frequently. Pertinent negatives include no fatigue. Risk factors include NSAIDs (800 mg bid for several years ). She has tried a PPI and a diet change (prilosec daily ) for the symptoms.     Plan; we will add Carafate before meals and bedtime have instructed patient this may worsen constipation she will discontinue if that becomes a problem.  Schedule patient for EGD and colonoscopy due to epigastric abdominal pain and initial screening colonoscopy.  Follow-up after test return office sooner if needed       The following portions of the patient's history were reviewed and updated as appropriate:   Past Medical History:   Diagnosis Date   • Arthropathy of lumbar facet joint    • Cervical spondylosis without myelopathy    • Chronic  cholecystitis    • Long term use of drug    • Lumbosacral radiculitis    • Myofascial pain    • Sebaceous cyst    • Trochanteric bursitis      Past Surgical History:   Procedure Laterality Date   • CYST REMOVAL  10/18/2015    EXC TR-EXT Benign+Hue 2.1-3 CM 39936 (Sebaceous cyst)    • CYSTOCELE REPAIR  10/18/2015    Repair Superficial Wound TR-EXT 2.5 < CM 22424 (Sebaceous cyst)      Family History   Problem Relation Age of Onset   • Cancer Other    • Diabetes Other    • Heart disease Other    • Hyperlipidemia Other    • Hypertension Other    • Thyroid disease Other    • Other Other         Headaches     OB History     No data available        Prior to Admission medications    Medication Sig Start Date End Date Taking? Authorizing Provider   citalopram (CeleXA) 40 MG tablet Take 40 mg by mouth daily.   Yes ProviderSanjay MD   gabapentin (NEURONTIN) 300 MG capsule Take 1 capsule by mouth 3 (Three) Times a Day. 1/30/17  Yes Ramón Booker MD   hydrochlorothiazide (MICROZIDE) 12.5 MG capsule Take 12.5 mg by mouth daily.   Yes ProviderSanjay MD   lisinopril (PRINIVIL,ZESTRIL) 20 MG tablet Take 20 mg by mouth daily.   Yes ProviderSanjay MD   loratadine (CLARITIN) 10 MG tablet Take 10 mg by mouth daily.   Yes ProviderSanjay MD   omeprazole (priLOSEC) 40 MG capsule TAKE 1 CAPSULE BY MOUTH EVERY DAY 2/6/18  Yes ProviderSanjay MD   meloxicam (MOBIC) 15 MG tablet Take 1 tablet by mouth Daily. 7/26/17 3/28/19  Ramón Booker MD     Allergies   Allergen Reactions   • Erythromycin Base Coated [Erythromycin]      Social History     Socioeconomic History   • Marital status: Single     Spouse name: Not on file   • Number of children: Not on file   • Years of education: Not on file   • Highest education level: Not on file   Tobacco Use   • Smoking status: Current Every Day Smoker     Types: Cigarettes   • Smokeless tobacco: Never Used   Substance and Sexual Activity   • Alcohol use: No   •  "Drug use: No   • Sexual activity: Defer       Review of Systems  Review of Systems   Constitutional: Negative for activity change, appetite change, chills, diaphoresis, fatigue, fever and unexpected weight change.   HENT: Negative for hoarse voice, sore throat and trouble swallowing.    Respiratory: Negative for cough, choking and shortness of breath.    Cardiovascular: Negative for chest pain.   Gastrointestinal: Positive for abdominal distention, abdominal pain and heartburn (better with prilosec ). Negative for anal bleeding, blood in stool, constipation, diarrhea, dysphagia, nausea, rectal pain and vomiting.   Musculoskeletal: Negative for arthralgias.   Skin: Negative for pallor.   Neurological: Negative for light-headedness.        /62 (BP Location: Left arm)   Pulse 86   Ht 162.6 cm (64\")   Wt 93 kg (205 lb 0.6 oz)   BMI 35.20 kg/m²     Objective    Physical Exam   Constitutional: She is oriented to person, place, and time. She appears well-developed and well-nourished. She is cooperative. No distress.   HENT:   Head: Normocephalic and atraumatic.   Neck: Normal range of motion. Neck supple. No thyromegaly present.   Cardiovascular: Normal rate, regular rhythm and normal heart sounds.   Pulmonary/Chest: Effort normal and breath sounds normal. She has no wheezes. She has no rhonchi. She has no rales.   Abdominal: Soft. Normal appearance and bowel sounds are normal. There is no hepatosplenomegaly. There is tenderness (very tender to palpation ) in the epigastric area. There is guarding. There is no rigidity. No hernia.   Lymphadenopathy:     She has no cervical adenopathy.   Neurological: She is alert and oriented to person, place, and time.   Skin: Skin is warm, dry and intact. No rash noted. No pallor.   Psychiatric: She has a normal mood and affect. Her speech is normal.     Office Visit on 11/07/2017   Component Date Value Ref Range Status   • Report Summary 11/07/2017 FINAL   Final    Comment: " ====================================================================  [employer] SELECT 13 (MW)  ====================================================================  Test                             Result       Flag       Units  Drug Present    Hydrocodone                    822                     ng/mg creat    Hydromorphone                  273                     ng/mg creat    Norhydrocodone                 563                     ng/mg creat     Sources of hydrocodone include scheduled prescription     medications. Hydromorphone and norhydrocodone are expected     metabolites of hydrocodone. Hydromorphone is also available as a     scheduled prescription medication.  ====================================================================  Test                      Result    Flag   Units      Ref Range    Creatinine              115              mg/dL      >=20  ====================================================================  Declared Medications:   Medication list was not                            provided.  ====================================================================  For clinical consultation, please call [phone].  ====================================================================     Assessment/Plan      1. Epigastric pain    2. Encounter for screening for malignant neoplasm of colon    .       Orders placed during this encounter include:  Orders Placed This Encounter   Procedures   • Follow Anesthesia Guidelines / Standing Orders     Standing Status:   Future       ESOPHAGOGASTRODUODENOSCOPY (N/A), COLONOSCOPY (N/A)    Review and/or summary of lab tests, radiology, procedures, medications. Review and summary of old records and obtaining of history. The risks and benefits of my recommendations, as well as other treatment options were discussed with the patient today. Questions were answered.    New Medications Ordered This Visit   Medications   • sucralfate (CARAFATE) 1 GM/10ML  suspension     Sig: Take 10 mL by mouth 4 (Four) Times a Day With Meals & at Bedtime.     Dispense:  1260 mL     Refill:  3   • sodium-potassium-magnesium sulfates (SUPREP BOWEL PREP KIT) 17.5-3.13-1.6 GM/177ML solution oral solution     Sig: Take 1 bottle by mouth Every 12 (Twelve) Hours.     Dispense:  2 bottle     Refill:  0       Follow-up: Return in about 4 weeks (around 4/25/2019).          This document has been electronically signed by YEIMY Bateman on March 28, 2019 3:04 PM             Results for orders placed or performed in visit on 11/07/17   [employer] 13 (MW) - Urine, Clean Catch   Result Value Ref Range    Report Summary FINAL    Results for orders placed or performed in visit on 06/26/17   [employer] 13 (MW)   Result Value Ref Range    Report Summary FINAL    Results for orders placed or performed during the hospital encounter of 09/07/16   Pain Management Profile (13 Drugs) Urine   Result Value Ref Range    Pain Management Drug Panel See Below     Codeine, Urine Not Detected     Morphine, Urine Present     6-acetylmorphine Not Detected     Oxycodone Screen, Urine Not Detected     Noroxycodone Not Detected     Oxymorphone UR Not Detected     Noroxymorphone Not Detected     Hydrocodone UR Not Detected     Norhydrocodone Not Detected     Hydromorphone Not Detected     Buprenorphine, Urine Not Detected     Norbuprenorphine Not Detected     Fentanyl, Urine Not Detected     Norfentanyl Not Detected     Normeperidine, Urine Not Detected     Tapentadol Not Detected     Tapentadol-o-Sulf Not Detected     Methadone Screen, Urine Not Detected     Propoxyphene Screen Not Detected     Tramadol Screen, Urine Not Detected     Amphetamine, Urine Qual Not Detected     Methamphetamine, Urine Not Detected     MDMA-Ecstasy Not Detected     MDA Not Detected     MDEA Not Detected     Phentermine Urine Not Detected     Benzoylecgonine, Urine Not Detected     Alprazolam Urine, Conf Not Detected      alpha-Hydroxyalprazolam, Quant, Urine Not Detected     Clonazepam Not Detected     7-Aminoclonazepam Urine Not Detected     Diazepam Urine, Qualitative Not Detected     Nordiazepam, Urine Not Detected     Oxazepam, urine Not Detected     Temazepam, urine Not Detected     Lorazepam Not Detected     Midazolam, Urine Not Detected     Zolpidem(Ambien) Urine Not Detected     Barbiturates Screen, Urine Not Detected     Ethyl Glucuronide Not Detected     Marijuana Metabolite Not Detected     Phencyclidine (PCP), Urine Not Detected     Carisoprodol, Serum Not Detected     Creatinine, Urine 39.1 20.0 - 400.0 mg/dL    Pain Management Drug Panel See Note     Methylphenidate Not Detected    Results for orders placed or performed during the hospital encounter of 02/17/16   Pain Management Profile (13 Drugs) Urine   Result Value Ref Range    Pain Management Drug Panel See Below     Codeine, Urine Not Detected     Morphine, Urine Present     6-acetylmorphine Not Detected     Oxycodone Screen, Urine Not Detected     Noroxycodone Not Detected     Oxymorphone UR Not Detected     Noroxymorphone Not Detected     Hydrocodone UR Not Detected     Norhydrocodone Not Detected     Hydromorphone Not Detected     Buprenorphine, Urine Not Detected     Norbuprenorphine Not Detected     Fentanyl, Urine Not Detected     Norfentanyl Not Detected     Normeperidine, Urine Not Detected     Tapentadol Not Detected     Tapentadol-o-Sulf Not Detected     Methadone Screen, Urine Not Detected     Propoxyphene Screen Not Detected     Tramadol Screen, Urine Not Detected     Amphetamine, Urine Qual Present     Methamphetamine, Urine Not Detected     MDMA-Ecstasy Not Detected     MDA Not Detected     MDEA Not Detected     Ritalinic Acid, Urine Not Detected     Phentermine Urine Not Detected     Benzoylecgonine, Urine Not Detected     Alprazolam Urine, Conf Not Detected     alpha-Hydroxyalprazolam, Quant, Urine Not Detected     Clonazepam Not Detected      7-Aminoclonazepam Urine Not Detected     Diazepam Urine, Qualitative Not Detected     Nordiazepam, Urine Not Detected     Oxazepam, urine Not Detected     Temazepam, urine Not Detected     Lorazepam Not Detected     Midazolam, Urine Not Detected     Zolpidem(Ambien) Urine Not Detected     Barbiturates Screen, Urine Not Detected     Ethyl Glucuronide Not Detected     Marijuana Metabolite Not Detected     Phencyclidine (PCP), Urine Not Detected     Carisoprodol, Serum Not Detected     Creatinine, Urine 129.9 20.0 - 400.0 mg/dL    Pain Management Drug Panel See Note    Results for orders placed or performed during the hospital encounter of 09/21/15   Converted Surgical Pathology   Result Value Ref Range    Spec Descr 1 SPECIMEN(S): A LESION, LEFT FOREARM    Results for orders placed or performed during the hospital encounter of 07/14/15   Urinalysis, Microscopic only   Result Value Ref Range    WBC, UA 1-2 0-5,0,RARE,1-2,2-4,3-5  /HPF    RBC, UA 0-2 0-2,0,RARE  /HPF    Bacteria, UA 1+ (A) 0,TRACE  /HPF    Mucus, UA 1+     Epithelial Cells, UA 1+ Squamous  /HPF     *Note: Due to a large number of results and/or encounters for the requested time period, some results have not been displayed. A complete set of results can be found in Results Review.

## 2019-03-28 NOTE — PATIENT INSTRUCTIONS
Abdominal Pain, Adult  Abdominal pain can be caused by many things. Often, abdominal pain is not serious and it gets better with no treatment or by being treated at home. However, sometimes abdominal pain is serious. Your health care provider will do a medical history and a physical exam to try to determine the cause of your abdominal pain.  Follow these instructions at home:  · Take over-the-counter and prescription medicines only as told by your health care provider. Do not take a laxative unless told by your health care provider.  · Drink enough fluid to keep your urine clear or pale yellow.  · Watch your condition for any changes.  · Keep all follow-up visits as told by your health care provider. This is important.  Contact a health care provider if:  · Your abdominal pain changes or gets worse.  · You are not hungry or you lose weight without trying.  · You are constipated or have diarrhea for more than 2-3 days.  · You have pain when you urinate or have a bowel movement.  · Your abdominal pain wakes you up at night.  · Your pain gets worse with meals, after eating, or with certain foods.  · You are throwing up and cannot keep anything down.  · You have a fever.  Get help right away if:  · Your pain does not go away as soon as your health care provider told you to expect.  · You cannot stop throwing up.  · Your pain is only in areas of the abdomen, such as the right side or the left lower portion of the abdomen.  · You have bloody or black stools, or stools that look like tar.  · You have severe pain, cramping, or bloating in your abdomen.  · You have signs of dehydration, such as:  ? Dark urine, very little urine, or no urine.  ? Cracked lips.  ? Dry mouth.  ? Sunken eyes.  ? Sleepiness.  ? Weakness.  This information is not intended to replace advice given to you by your health care provider. Make sure you discuss any questions you have with your health care provider.  Document Released: 09/27/2006 Document  Revised: 07/07/2017 Document Reviewed: 05/31/2017  [employer] Interactive Patient Education © 2019 [employer]    Food Choices for Gastroesophageal Reflux Disease, Adult  When you have gastroesophageal reflux disease (GERD), the foods you eat and your eating habits are very important. Choosing the right foods can help ease your discomfort. Think about working with a nutrition specialist (dietitian) to help you make good choices.  What are tips for following this plan?  Meals  · Choose healthy foods that are low in fat, such as fruits, vegetables, whole grains, low-fat dairy products, and lean meat, fish, and poultry.  · Eat small meals often instead of 3 large meals a day. Eat your meals slowly, and in a place where you are relaxed. Avoid bending over or lying down until 2-3 hours after eating.  · Avoid eating meals 2-3 hours before bed.  · Avoid drinking a lot of liquid with meals.  · Cook foods using methods other than frying. Bake, grill, or broil food instead.  · Avoid or limit:  ? Chocolate.  ? Peppermint or spearmint.  ? Alcohol.  ? Pepper.  ? Black and decaffeinated coffee.  ? Black and decaffeinated tea.  ? Bubbly (carbonated) soft drinks.  ? Caffeinated energy drinks and soft drinks.  · Limit high-fat foods such as:  ? Fatty meat or fried foods.  ? Whole milk, cream, butter, or ice cream.  ? Nuts and nut butters.  ? Pastries, donuts, and sweets made with butter or shortening.  · Avoid foods that cause symptoms. These foods may be different for everyone. Common foods that cause symptoms include:  ? Tomatoes.  ? Oranges, lavon, and limes.  ? Peppers.  ? Spicy food.  ? Onions and garlic.  ? Vinegar.  Lifestyle    · Maintain a healthy weight. Ask your doctor what weight is healthy for you. If you need to lose weight, work with your doctor to do so safely.  · Exercise for at least 30 minutes for 5 or more days each week, or as told by your doctor.  · Wear loose-fitting clothes.  · Do not smoke. If you need help  quitting, ask your doctor.  · Sleep with the head of your bed higher than your feet. Use a wedge under the mattress or blocks under the bed frame to raise the head of the bed.  Summary  · When you have gastroesophageal reflux disease (GERD), food and lifestyle choices are very important in easing your symptoms.  · Eat small meals often instead of 3 large meals a day. Eat your meals slowly, and in a place where you are relaxed.  · Limit high-fat foods such as fatty meat or fried foods.  · Avoid bending over or lying down until 2-3 hours after eating.  · Avoid peppermint and spearmint, caffeine, alcohol, and chocolate.  This information is not intended to replace advice given to you by your health care provider. Make sure you discuss any questions you have with your health care provider.  Document Released: 06/18/2013 Document Revised: 01/23/2018 Document Reviewed: 01/23/2018  [employer] Interactive Patient Education © 2019 [employer]

## 2019-04-18 RX ORDER — SUCRALFATE ORAL 1 G/10ML
1 SUSPENSION ORAL
Qty: 1260 ML | Refills: 3 | Status: SHIPPED | OUTPATIENT
Start: 2019-04-18 | End: 2019-04-26

## 2019-04-26 RX ORDER — HYDROCODONE BITARTRATE AND ACETAMINOPHEN 7.5; 325 MG/1; MG/1
1 TABLET ORAL 3 TIMES DAILY
COMMUNITY

## 2019-04-26 RX ORDER — CYCLOBENZAPRINE HCL 10 MG
10 TABLET ORAL 3 TIMES DAILY PRN
COMMUNITY
End: 2019-06-11

## 2019-04-30 ENCOUNTER — ANESTHESIA EVENT (OUTPATIENT)
Dept: GASTROENTEROLOGY | Facility: HOSPITAL | Age: 54
End: 2019-04-30

## 2019-04-30 ENCOUNTER — HOSPITAL ENCOUNTER (OUTPATIENT)
Facility: HOSPITAL | Age: 54
Setting detail: HOSPITAL OUTPATIENT SURGERY
Discharge: HOME | End: 2019-04-30
Attending: INTERNAL MEDICINE | Admitting: NURSE PRACTITIONER

## 2019-04-30 ENCOUNTER — ANESTHESIA (OUTPATIENT)
Dept: GASTROENTEROLOGY | Facility: HOSPITAL | Age: 54
End: 2019-04-30

## 2019-04-30 VITALS
DIASTOLIC BLOOD PRESSURE: 63 MMHG | SYSTOLIC BLOOD PRESSURE: 104 MMHG | TEMPERATURE: 97.1 F | HEART RATE: 70 BPM | WEIGHT: 206.44 LBS | OXYGEN SATURATION: 96 % | RESPIRATION RATE: 18 BRPM | HEIGHT: 64 IN | BODY MASS INDEX: 35.24 KG/M2

## 2019-04-30 DIAGNOSIS — R10.13 EPIGASTRIC PAIN: ICD-10-CM

## 2019-04-30 DIAGNOSIS — Z12.11 ENCOUNTER FOR SCREENING FOR MALIGNANT NEOPLASM OF COLON: ICD-10-CM

## 2019-04-30 PROCEDURE — 88305 TISSUE EXAM BY PATHOLOGIST: CPT | Performed by: INTERNAL MEDICINE

## 2019-04-30 PROCEDURE — 25010000002 FENTANYL CITRATE (PF) 100 MCG/2ML SOLUTION: Performed by: NURSE ANESTHETIST, CERTIFIED REGISTERED

## 2019-04-30 PROCEDURE — 45385 COLONOSCOPY W/LESION REMOVAL: CPT | Performed by: INTERNAL MEDICINE

## 2019-04-30 PROCEDURE — 88305 TISSUE EXAM BY PATHOLOGIST: CPT | Performed by: PATHOLOGY

## 2019-04-30 PROCEDURE — 25010000002 PROPOFOL 10 MG/ML EMULSION: Performed by: NURSE ANESTHETIST, CERTIFIED REGISTERED

## 2019-04-30 PROCEDURE — 43239 EGD BIOPSY SINGLE/MULTIPLE: CPT | Performed by: INTERNAL MEDICINE

## 2019-04-30 PROCEDURE — 25010000002 MIDAZOLAM PER 1 MG: Performed by: NURSE ANESTHETIST, CERTIFIED REGISTERED

## 2019-04-30 RX ORDER — PROPOFOL 10 MG/ML
VIAL (ML) INTRAVENOUS AS NEEDED
Status: DISCONTINUED | OUTPATIENT
Start: 2019-04-30 | End: 2019-04-30 | Stop reason: SURG

## 2019-04-30 RX ORDER — PROMETHAZINE HYDROCHLORIDE 25 MG/ML
12.5 INJECTION, SOLUTION INTRAMUSCULAR; INTRAVENOUS ONCE AS NEEDED
Status: DISCONTINUED | OUTPATIENT
Start: 2019-04-30 | End: 2019-04-30 | Stop reason: HOSPADM

## 2019-04-30 RX ORDER — LIDOCAINE HYDROCHLORIDE 10 MG/ML
INJECTION, SOLUTION INFILTRATION; PERINEURAL AS NEEDED
Status: DISCONTINUED | OUTPATIENT
Start: 2019-04-30 | End: 2019-04-30 | Stop reason: SURG

## 2019-04-30 RX ORDER — FENTANYL CITRATE 50 UG/ML
INJECTION, SOLUTION INTRAMUSCULAR; INTRAVENOUS AS NEEDED
Status: DISCONTINUED | OUTPATIENT
Start: 2019-04-30 | End: 2019-04-30 | Stop reason: SURG

## 2019-04-30 RX ORDER — DEXTROSE AND SODIUM CHLORIDE 5; .45 G/100ML; G/100ML
30 INJECTION, SOLUTION INTRAVENOUS CONTINUOUS PRN
Status: DISCONTINUED | OUTPATIENT
Start: 2019-04-30 | End: 2019-04-30 | Stop reason: HOSPADM

## 2019-04-30 RX ORDER — ONDANSETRON 2 MG/ML
4 INJECTION INTRAMUSCULAR; INTRAVENOUS ONCE AS NEEDED
Status: DISCONTINUED | OUTPATIENT
Start: 2019-04-30 | End: 2019-04-30 | Stop reason: HOSPADM

## 2019-04-30 RX ORDER — DEXAMETHASONE SODIUM PHOSPHATE 4 MG/ML
8 INJECTION, SOLUTION INTRA-ARTICULAR; INTRALESIONAL; INTRAMUSCULAR; INTRAVENOUS; SOFT TISSUE ONCE AS NEEDED
Status: DISCONTINUED | OUTPATIENT
Start: 2019-04-30 | End: 2019-04-30 | Stop reason: SDUPTHER

## 2019-04-30 RX ORDER — PROMETHAZINE HYDROCHLORIDE 25 MG/1
25 TABLET ORAL ONCE AS NEEDED
Status: DISCONTINUED | OUTPATIENT
Start: 2019-04-30 | End: 2019-04-30 | Stop reason: HOSPADM

## 2019-04-30 RX ORDER — PROMETHAZINE HYDROCHLORIDE 25 MG/1
25 SUPPOSITORY RECTAL ONCE AS NEEDED
Status: DISCONTINUED | OUTPATIENT
Start: 2019-04-30 | End: 2019-04-30 | Stop reason: HOSPADM

## 2019-04-30 RX ORDER — MIDAZOLAM HYDROCHLORIDE 1 MG/ML
INJECTION INTRAMUSCULAR; INTRAVENOUS AS NEEDED
Status: DISCONTINUED | OUTPATIENT
Start: 2019-04-30 | End: 2019-04-30 | Stop reason: SURG

## 2019-04-30 RX ADMIN — PROPOFOL 60 MG: 10 INJECTION, EMULSION INTRAVENOUS at 14:13

## 2019-04-30 RX ADMIN — FENTANYL CITRATE 25 MCG: 50 INJECTION, SOLUTION INTRAMUSCULAR; INTRAVENOUS at 14:23

## 2019-04-30 RX ADMIN — FENTANYL CITRATE 50 MCG: 50 INJECTION, SOLUTION INTRAMUSCULAR; INTRAVENOUS at 14:12

## 2019-04-30 RX ADMIN — FENTANYL CITRATE 25 MCG: 50 INJECTION, SOLUTION INTRAMUSCULAR; INTRAVENOUS at 14:18

## 2019-04-30 RX ADMIN — PROPOFOL 20 MG: 10 INJECTION, EMULSION INTRAVENOUS at 14:25

## 2019-04-30 RX ADMIN — DEXTROSE AND SODIUM CHLORIDE 30 ML/HR: 5; 450 INJECTION, SOLUTION INTRAVENOUS at 13:30

## 2019-04-30 RX ADMIN — MIDAZOLAM HYDROCHLORIDE 2 MG: 2 INJECTION, SOLUTION INTRAMUSCULAR; INTRAVENOUS at 14:09

## 2019-04-30 RX ADMIN — PROPOFOL 20 MG: 10 INJECTION, EMULSION INTRAVENOUS at 14:16

## 2019-04-30 RX ADMIN — LIDOCAINE HYDROCHLORIDE 100 MG: 10 INJECTION, SOLUTION INFILTRATION; PERINEURAL at 14:13

## 2019-04-30 RX ADMIN — PROPOFOL 20 MG: 10 INJECTION, EMULSION INTRAVENOUS at 14:31

## 2019-04-30 NOTE — ANESTHESIA PREPROCEDURE EVALUATION
Anesthesia Evaluation     Patient summary reviewed and Nursing notes reviewed   NPO Solid Status: > 8 hours  NPO Liquid Status: > 8 hours           Airway   Mallampati: III  TM distance: <3 FB  Neck ROM: full  Possible difficult intubation  Dental - normal exam     Pulmonary - normal exam   (+) a smoker Current Smoked day of surgery,   Cardiovascular - normal exam        Neuro/Psych  (+) numbness, psychiatric history Anxiety and Depression,     GI/Hepatic/Renal/Endo    (+) morbid obesity, GERD well controlled,      Musculoskeletal     (+) neck pain,   Abdominal  - normal exam   Substance History      OB/GYN          Other   (+) arthritis       Other Comment:   Chronic pain                Anesthesia Plan    ASA 2     MAC     intravenous induction   Anesthetic plan, all risks, benefits, and alternatives have been provided, discussed and informed consent has been obtained with: patient.

## 2019-04-30 NOTE — ANESTHESIA POSTPROCEDURE EVALUATION
Patient: Kalyn Gilliland    Procedure Summary     Date:  04/30/19 Room / Location:  [facility] ENDOSCOPY 2 / [facility]COPY    Anesthesia Start:  1412 Anesthesia Stop:  1437    Procedures:       ESOPHAGOGASTRODUODENOSCOPY (N/A )      COLONOSCOPY (N/A ) Diagnosis:       Epigastric pain      Encounter for screening for malignant neoplasm of colon      (Epigastric pain [R10.13])      (Encounter for screening for malignant neoplasm of colon [Z12.11])    Surgeon:  Navid Swift MD Provider:  Yesica Arce CRNA    Anesthesia Type:  MAC ASA Status:  2          Anesthesia Type: MAC  Last vitals  BP   133/80 (04/30/19 1320)   Temp   97 °F (36.1 °C) (04/30/19 1320)   Pulse   104 (04/30/19 1320)   Resp   18 (04/30/19 1320)     SpO2   98 % (04/30/19 1320)     Post Anesthesia Care and Evaluation    Patient location during evaluation: bedside  Level of consciousness: sleepy but conscious  Pain score: 0  Pain management: adequate  Airway patency: patent  Anesthetic complications: No anesthetic complications  PONV Status: none  Cardiovascular status: acceptable  Respiratory status: acceptable  Hydration status: acceptable

## 2019-05-02 LAB
LAB AP CASE REPORT: NORMAL
PATH REPORT.FINAL DX SPEC: NORMAL
PATH REPORT.GROSS SPEC: NORMAL

## 2019-05-07 ENCOUNTER — OFFICE VISIT (OUTPATIENT)
Dept: GASTROENTEROLOGY | Facility: CLINIC | Age: 54
End: 2019-05-07

## 2019-05-07 VITALS
WEIGHT: 212.6 LBS | HEART RATE: 74 BPM | BODY MASS INDEX: 36.29 KG/M2 | SYSTOLIC BLOOD PRESSURE: 120 MMHG | HEIGHT: 64 IN | DIASTOLIC BLOOD PRESSURE: 72 MMHG

## 2019-05-07 DIAGNOSIS — K29.50 CHRONIC GASTRITIS WITHOUT BLEEDING, UNSPECIFIED GASTRITIS TYPE: ICD-10-CM

## 2019-05-07 DIAGNOSIS — D48.9 VILLOUS ADENOMA: Primary | ICD-10-CM

## 2019-05-07 DIAGNOSIS — D12.5 BENIGN NEOPLASM OF SIGMOID COLON: ICD-10-CM

## 2019-05-07 DIAGNOSIS — K21.9 GASTROESOPHAGEAL REFLUX DISEASE WITHOUT ESOPHAGITIS: ICD-10-CM

## 2019-05-07 PROCEDURE — 99214 OFFICE O/P EST MOD 30 MIN: CPT | Performed by: NURSE PRACTITIONER

## 2019-05-07 RX ORDER — SUCRALFATE 1 G/1
1 TABLET ORAL 4 TIMES DAILY
Qty: 120 TABLET | Refills: 3 | Status: SHIPPED | OUTPATIENT
Start: 2019-05-07 | End: 2021-06-15

## 2019-05-07 NOTE — PROGRESS NOTES
Chief Complaint   Patient presents with   • Abdominal Pain   • Colon Polyps       Subjective    Kalyn Gilliland is a 54 y.o. female. she is here today for follow-up.  54-year-old female presents to discuss abdominal pain EGD and colonoscopy result.  She has issues with chronic constipation since being on chronic pain medication per pain management however since colonoscopy has had diarrhea about 2 times per day.  Denies any melena or hematochezia.    Abdominal Pain   Associated symptoms include diarrhea (about 2 per day ). Pertinent negatives include no arthralgias, belching, constipation, fever, nausea or vomiting. Her past medical history is significant for GERD.   Heartburn   She complains of abdominal pain, dysphagia and heartburn. She reports no belching, no chest pain, no choking, no coughing, no early satiety, no hoarse voice, no nausea or no sore throat. This is a chronic problem. The current episode started more than 1 month ago. The problem has been waxing and waning. The heartburn duration is several minutes. The heartburn is located in the substernum. The heartburn is of moderate intensity. The heartburn does not wake her from sleep. The heartburn does not limit her activity. The heartburn doesn't change with position. The symptoms are aggravated by certain foods. Pertinent negatives include no fatigue. She has tried a PPI for the symptoms. The treatment provided mild relief.   EGD noted mildly severe esophagitis, gastritis normal duodenum.  Antral biopsy no reactive gastropathy.  Distal esophagus biopsy no reactive changes squamous mucosa.  Next time colonoscopy had adequate prep and noted normal perianal digital rectal exam.  2 large polyps were found and removed from sigmoid colon.  Polyps were villous adenoma and tubular adenoma.  Villous adenoma measured 2.2 x 1.3 x 0.9 cm and adenomatous polyp measured 0.4 x 0.3 x 0.2 cm.  Patient also has family history of colon cancer in her  grandmother.  Plan; we will start patient on Carafate she was not able to obtain medication due to cost at last visit new prescription sent will obtain prior authorization.  Discussed importance of dietary modifications.  Recommend avoidance of gastric irritants avoiding monster beverages, and states and full list provided to patient.  Recommend standard antireflux measures.  Follow-up in 8 weeks for recheck return office sooner if needed.  Repeat colonoscopy in 1 year due to large villous adenoma sigmoid colon and family history          The following portions of the patient's history were reviewed and updated as appropriate:   Past Medical History:   Diagnosis Date   • Arthropathy of lumbar facet joint    • Cervical spondylosis without myelopathy    • Chronic cholecystitis    • Long term use of drug    • Lumbosacral radiculitis    • Myofascial pain    • Sebaceous cyst    • Trochanteric bursitis      Past Surgical History:   Procedure Laterality Date   • COLONOSCOPY N/A 4/30/2019    Procedure: COLONOSCOPY;  Surgeon: Navid Swift MD;  Location: [facility] ENDOSCOPY;  Service: Gastroenterology   • CYST REMOVAL  10/18/2015    EXC TR-EXT Benign+Hue 2.1-3 CM 16362 (Sebaceous cyst)    • CYSTOCELE REPAIR  10/18/2015    Repair Superficial Wound TR-EXT 2.5 < CM 29037 (Sebaceous cyst)    • ENDOSCOPY N/A 4/30/2019    Procedure: ESOPHAGOGASTRODUODENOSCOPY;  Surgeon: Navid Swift MD;  Location: [facility] ENDOSCOPY;  Service: Gastroenterology     Family History   Problem Relation Age of Onset   • Cancer Other    • Diabetes Other    • Heart disease Other    • Hyperlipidemia Other    • Hypertension Other    • Thyroid disease Other    • Other Other         Headaches     OB History     No data available        Prior to Admission medications    Medication Sig Start Date End Date Taking? Authorizing Provider   citalopram (CeleXA) 40 MG tablet Take 40 mg by mouth daily.   Yes Provider, MD Sanjay   cyclobenzaprine (FLEXERIL) 10  MG tablet Take 10 mg by mouth 3 (Three) Times a Day As Needed for Muscle Spasms.   Yes ProviderSanjay MD   gabapentin (NEURONTIN) 300 MG capsule Take 1 capsule by mouth 3 (Three) Times a Day. 1/30/17  Yes Ramón Booker MD   hydrochlorothiazide (MICROZIDE) 12.5 MG capsule Take 12.5 mg by mouth daily.   Yes ProviderSanjay MD   HYDROcodone-acetaminophen (NORCO) 7.5-325 MG per tablet Take 1 tablet by mouth 2 (Two) Times a Day.   Yes Sanjay Gregory MD   lisinopril (PRINIVIL,ZESTRIL) 20 MG tablet Take 20 mg by mouth daily.   Yes Sanjay Gregory MD   loratadine (CLARITIN) 10 MG tablet Take 10 mg by mouth daily.   Yes Sanjay Gregory MD   omeprazole (priLOSEC) 40 MG capsule TAKE 1 CAPSULE BY MOUTH EVERY DAY 2/6/18  Yes Sanjay Gregory MD   sucralfate (CARAFATE) 1 g tablet Take 1 tablet by mouth 4 (Four) Times a Day. 5/7/19   Caity Dias APRN     Allergies   Allergen Reactions   • Erythromycin Base Coated [Erythromycin]      Social History     Socioeconomic History   • Marital status: Single     Spouse name: Not on file   • Number of children: Not on file   • Years of education: Not on file   • Highest education level: Not on file   Tobacco Use   • Smoking status: Current Every Day Smoker     Types: Cigarettes   • Smokeless tobacco: Never Used   Substance and Sexual Activity   • Alcohol use: No   • Drug use: No   • Sexual activity: Defer       Review of Systems  Review of Systems   Constitutional: Negative for activity change, appetite change, chills, diaphoresis, fatigue, fever and unexpected weight change.   HENT: Negative for hoarse voice, sore throat and trouble swallowing.    Respiratory: Negative for cough, choking and shortness of breath.    Cardiovascular: Negative for chest pain.   Gastrointestinal: Positive for abdominal pain, diarrhea (about 2 per day ), dysphagia and heartburn. Negative for abdominal distention, anal bleeding, blood in stool, constipation, nausea,  "rectal pain and vomiting.   Musculoskeletal: Negative for arthralgias.   Skin: Negative for pallor.   Neurological: Negative for light-headedness.        /72 (BP Location: Left arm)   Pulse 74   Ht 162.6 cm (64\")   Wt 96.4 kg (212 lb 9.6 oz)   BMI 36.49 kg/m²     Objective    Physical Exam   Constitutional: She is oriented to person, place, and time. She appears well-developed and well-nourished. She is cooperative. No distress.   HENT:   Head: Normocephalic and atraumatic.   Neck: Normal range of motion. Neck supple. No thyromegaly present.   Cardiovascular: Normal rate, regular rhythm and normal heart sounds.   Pulmonary/Chest: Effort normal and breath sounds normal. She has no wheezes. She has no rhonchi. She has no rales.   Abdominal: Soft. Normal appearance and bowel sounds are normal. She exhibits no distension. There is no hepatosplenomegaly. There is tenderness in the epigastric area. There is no rigidity and no guarding. No hernia.   Lymphadenopathy:     She has no cervical adenopathy.   Neurological: She is alert and oriented to person, place, and time.   Skin: Skin is warm, dry and intact. No rash noted. No pallor.   Psychiatric: She has a normal mood and affect. Her speech is normal.     Admission on 04/30/2019, Discharged on 04/30/2019   Component Date Value Ref Range Status   • Case Report 04/30/2019    Final                    Value:Surgical Pathology Report                         Case: [id number]                                  Authorizing Provider:  Navid Swift MD        Collected:           04/30/2019 02:18 PM          Ordering Location:     [facility]             Received:            05/01/2019 06:34 AM                                 [place] [facility]                                                     Pathologist:           Devyn Barrett MD                                                         Specimens:   1) - Gastric, Antrum, antrum bx                          "                                            2) - Esophagus, Distal, distal esophagus bx                                                         3) - Large Intestine, Sigmoid Colon, sigmoid colon polyps X 2 (hot snare)                 • Final Diagnosis 04/30/2019    Final                    Value:This result contains rich text formatting which cannot be displayed here.   • Gross Description 04/30/2019    Final                    Value:This result contains rich text formatting which cannot be displayed here.     Assessment/Plan      1. Villous adenoma    2. Gastroesophageal reflux disease without esophagitis    3. Chronic gastritis without bleeding, unspecified gastritis type    4. Benign neoplasm of sigmoid colon    .       Orders placed during this encounter include:  No orders of the defined types were placed in this encounter.      * Surgery not found *    Review and/or summary of lab tests, radiology, procedures, medications. Review and summary of old records and obtaining of history. The risks and benefits of my recommendations, as well as other treatment options were discussed with the patient today. Questions were answered.    New Medications Ordered This Visit   Medications   • sucralfate (CARAFATE) 1 g tablet     Sig: Take 1 tablet by mouth 4 (Four) Times a Day.     Dispense:  120 tablet     Refill:  3       Follow-up: Return in about 8 weeks (around 7/2/2019).          This document has been electronically signed by YEIMY Bateman on May 7, 2019 2:55 PM             Results for orders placed or performed during the hospital encounter of 04/30/19   Tissue Pathology Exam   Result Value Ref Range    Case Report       Surgical Pathology Report                         Case: [id number]                                  Authorizing Provider:  Navid Swift MD        Collected:           04/30/2019 02:18 PM          Ordering Location:     [facility]             Received:            05/01/2019 06:34 AM        "                          [facility]                                                     Pathologist:           Devyn Barrett MD                                                         Specimens:   1) - Gastric, Antrum, antrum bx                                                                     2) - Esophagus, Distal, distal esophagus bx                                                         3) - Large Intestine, Sigmoid Colon, sigmoid colon polyps X 2 (hot snare)                  Final Diagnosis       1.  MUCOSA, ANTRUM OF STOMACH:   REACTIVE GASTROPATHY.    2.  MUCOSA, DISTAL ESOPHAGUS:   REACTIVE CHANGE OF SQUAMOUS MUCOSA.     3.  POLYPS (2), SIGMOID COLON:   VILLOUS ADENOMA.   TUBULAR ADENOMA.       Gross Description       1.  The first container is labeled \"antrum of stomach\" and has a nodular fragment of white soft tissue measuring 0.4 cm in greatest dimension.  The entire specimen is embedded as 1A.    2.  The second container is labeled \"distal esophagus\" and has nodular bits of white soft tissue measuring 0.4 cc in aggregate.  The entire specimen is embedded as 2A.    3.  The third container is labeled \"polyps (2), sigmoid colon\" and has 2 brown polypoid fragments of tissue measuring 2.2 x 1.3 x 0.9 cm and 0.4 x 0.3 x 0.2 cm.  All specimens are entirely embedded as 3A.      Results for orders placed or performed in visit on 11/07/17   [facility] [employer] 13 (MW) - Urine, Clean Catch   Result Value Ref Range    Report Summary FINAL    Results for orders placed or performed in visit on 06/26/17   [employer] (MW)   Result Value Ref Range    Report Summary FINAL    Results for orders placed or performed during the hospital encounter of 09/07/16   Pain Management Profile (13 Drugs) Urine   Result Value Ref Range    Pain Management Drug Panel See Below     Codeine, Urine Not Detected     Morphine, Urine Present     6-acetylmorphine Not Detected     Oxycodone Screen, Urine Not Detected  "    Noroxycodone Not Detected     Oxymorphone UR Not Detected     Noroxymorphone Not Detected     Hydrocodone UR Not Detected     Norhydrocodone Not Detected     Hydromorphone Not Detected     Buprenorphine, Urine Not Detected     Norbuprenorphine Not Detected     Fentanyl, Urine Not Detected     Norfentanyl Not Detected     Normeperidine, Urine Not Detected     Tapentadol Not Detected     Tapentadol-o-Sulf Not Detected     Methadone Screen, Urine Not Detected     Propoxyphene Screen Not Detected     Tramadol Screen, Urine Not Detected     Amphetamine, Urine Qual Not Detected     Methamphetamine, Urine Not Detected     MDMA-Ecstasy Not Detected     MDA Not Detected     MDEA Not Detected     Phentermine Urine Not Detected     Benzoylecgonine, Urine Not Detected     Alprazolam Urine, Conf Not Detected     alpha-Hydroxyalprazolam, Quant, Urine Not Detected     Clonazepam Not Detected     7-Aminoclonazepam Urine Not Detected     Diazepam Urine, Qualitative Not Detected     Nordiazepam, Urine Not Detected     Oxazepam, urine Not Detected     Temazepam, urine Not Detected     Lorazepam Not Detected     Midazolam, Urine Not Detected     Zolpidem(Ambien) Urine Not Detected     Barbiturates Screen, Urine Not Detected     Ethyl Glucuronide Not Detected     Marijuana Metabolite Not Detected     Phencyclidine (PCP), Urine Not Detected     Carisoprodol, Serum Not Detected     Creatinine, Urine 39.1 20.0 - 400.0 mg/dL    Pain Management Drug Panel See Note     Methylphenidate Not Detected    Results for orders placed or performed during the hospital encounter of 02/17/16   Pain Management Profile (13 Drugs) Urine   Result Value Ref Range    Pain Management Drug Panel See Below     Codeine, Urine Not Detected     Morphine, Urine Present     6-acetylmorphine Not Detected     Oxycodone Screen, Urine Not Detected     Noroxycodone Not Detected     Oxymorphone UR Not Detected     Noroxymorphone Not Detected     Hydrocodone UR Not  Detected     Norhydrocodone Not Detected     Hydromorphone Not Detected     Buprenorphine, Urine Not Detected     Norbuprenorphine Not Detected     Fentanyl, Urine Not Detected     Norfentanyl Not Detected     Normeperidine, Urine Not Detected     Tapentadol Not Detected     Tapentadol-o-Sulf Not Detected     Methadone Screen, Urine Not Detected     Propoxyphene Screen Not Detected     Tramadol Screen, Urine Not Detected     Amphetamine, Urine Qual Present     Methamphetamine, Urine Not Detected     MDMA-Ecstasy Not Detected     MDA Not Detected     MDEA Not Detected     Ritalinic Acid, Urine Not Detected     Phentermine Urine Not Detected     Benzoylecgonine, Urine Not Detected     Alprazolam Urine, Conf Not Detected     alpha-Hydroxyalprazolam, Quant, Urine Not Detected     Clonazepam Not Detected     7-Aminoclonazepam Urine Not Detected     Diazepam Urine, Qualitative Not Detected     Nordiazepam, Urine Not Detected     Oxazepam, urine Not Detected     Temazepam, urine Not Detected     Lorazepam Not Detected     Midazolam, Urine Not Detected     Zolpidem(Ambien) Urine Not Detected     Barbiturates Screen, Urine Not Detected     Ethyl Glucuronide Not Detected     Marijuana Metabolite Not Detected     Phencyclidine (PCP), Urine Not Detected     Carisoprodol, Serum Not Detected     Creatinine, Urine 129.9 20.0 - 400.0 mg/dL    Pain Management Drug Panel See Note    Results for orders placed or performed during the hospital encounter of 09/21/15   Converted Surgical Pathology   Result Value Ref Range    Spec Descr 1 SPECIMEN(S): A LESION, LEFT FOREARM      *Note: Due to a large number of results and/or encounters for the requested time period, some results have not been displayed. A complete set of results can be found in Results Review.

## 2019-05-07 NOTE — PATIENT INSTRUCTIONS
Colon Polyps  Polyps are tissue growths inside the body. Polyps can grow in many places, including the large intestine (colon). A polyp may be a round bump or a mushroom-shaped growth. You could have one polyp or several.  Most colon polyps are noncancerous (benign). However, some colon polyps can become cancerous over time.  What are the causes?  The exact cause of colon polyps is not known.  What increases the risk?  This condition is more likely to develop in people who:  · Have a family history of colon cancer or colon polyps.  · Are older than 50 or older than 45 if they are .  · Have inflammatory bowel disease, such as ulcerative colitis or Crohn disease.  · Are overweight.  · Smoke cigarettes.  · Do not get enough exercise.  · Drink too much alcohol.  · Eat a diet that is:  ? High in fat and red meat.  ? Low in fiber.  · Had childhood cancer that was treated with abdominal radiation.    What are the signs or symptoms?  Most polyps do not cause symptoms. If you have symptoms, they may include:  · Blood coming from your rectum when having a bowel movement.  · Blood in your stool. The stool may look dark red or black.  · A change in bowel habits, such as constipation or diarrhea.    How is this diagnosed?  This condition is diagnosed with a colonoscopy. This is a procedure that uses a lighted, flexible scope to look at the inside of your colon.  How is this treated?  Treatment for this condition involves removing any polyps that are found. Those polyps will then be tested for cancer. If cancer is found, your health care provider will talk to you about options for colon cancer treatment.  Follow these instructions at home:  Diet  · Eat plenty of fiber, such as fruits, vegetables, and whole grains.  · Eat foods that are high in calcium and vitamin D, such as milk, cheese, yogurt, eggs, liver, fish, and broccoli.  · Limit foods high in fat, red meats, and processed meats, such as hot dogs, sausage,  west, and lunch meats.  · Maintain a healthy weight, or lose weight if recommended by your health care provider.  General instructions  · Do not smoke cigarettes.  · Do not drink alcohol excessively.  · Keep all follow-up visits as told by your health care provider. This is important. This includes keeping regularly scheduled colonoscopies. Talk to your health care provider about when you need a colonoscopy.  · Exercise every day or as told by your health care provider.  Contact a health care provider if:  · You have new or worsening bleeding during a bowel movement.  · You have new or increased blood in your stool.  · You have a change in bowel habits.  · You unexpectedly lose weight.  This information is not intended to replace advice given to you by your health care provider. Make sure you discuss any questions you have with your health care provider.  Document Released: 09/13/2005 Document Revised: 05/25/2017 Document Reviewed: 11/07/2016  [employer] Interactive Patient Education © 2019 [employer]    Food Choices for Gastroesophageal Reflux Disease, Adult  When you have gastroesophageal reflux disease (GERD), the foods you eat and your eating habits are very important. Choosing the right foods can help ease your discomfort. Think about working with a nutrition specialist (dietitian) to help you make good choices.  What are tips for following this plan?  Meals  · Choose healthy foods that are low in fat, such as fruits, vegetables, whole grains, low-fat dairy products, and lean meat, fish, and poultry.  · Eat small meals often instead of 3 large meals a day. Eat your meals slowly, and in a place where you are relaxed. Avoid bending over or lying down until 2-3 hours after eating.  · Avoid eating meals 2-3 hours before bed.  · Avoid drinking a lot of liquid with meals.  · Cook foods using methods other than frying. Bake, grill, or broil food instead.  · Avoid or limit:  ? Chocolate.  ? Peppermint or  spearmint.  ? Alcohol.  ? Pepper.  ? Black and decaffeinated coffee.  ? Black and decaffeinated tea.  ? Bubbly (carbonated) soft drinks.  ? Caffeinated energy drinks and soft drinks.  · Limit high-fat foods such as:  ? Fatty meat or fried foods.  ? Whole milk, cream, butter, or ice cream.  ? Nuts and nut butters.  ? Pastries, donuts, and sweets made with butter or shortening.  · Avoid foods that cause symptoms. These foods may be different for everyone. Common foods that cause symptoms include:  ? Tomatoes.  ? Oranges, lavon, and limes.  ? Peppers.  ? Spicy food.  ? Onions and garlic.  ? Vinegar.  Lifestyle    · Maintain a healthy weight. Ask your doctor what weight is healthy for you. If you need to lose weight, work with your doctor to do so safely.  · Exercise for at least 30 minutes for 5 or more days each week, or as told by your doctor.  · Wear loose-fitting clothes.  · Do not smoke. If you need help quitting, ask your doctor.  · Sleep with the head of your bed higher than your feet. Use a wedge under the mattress or blocks under the bed frame to raise the head of the bed.  Summary  · When you have gastroesophageal reflux disease (GERD), food and lifestyle choices are very important in easing your symptoms.  · Eat small meals often instead of 3 large meals a day. Eat your meals slowly, and in a place where you are relaxed.  · Limit high-fat foods such as fatty meat or fried foods.  · Avoid bending over or lying down until 2-3 hours after eating.  · Avoid peppermint and spearmint, caffeine, alcohol, and chocolate.  This information is not intended to replace advice given to you by your health care provider. Make sure you discuss any questions you have with your health care provider.  Document Released: 06/18/2013 Document Revised: 01/23/2018 Document Reviewed: 01/23/2018  [employer] Interactive Patient Education © 2019 [employer]

## 2019-06-11 ENCOUNTER — OFFICE VISIT (OUTPATIENT)
Dept: SLEEP MEDICINE | Facility: HOSPITAL | Age: 54
End: 2019-06-11

## 2019-06-11 VITALS
OXYGEN SATURATION: 97 % | HEART RATE: 76 BPM | BODY MASS INDEX: 36.91 KG/M2 | DIASTOLIC BLOOD PRESSURE: 83 MMHG | SYSTOLIC BLOOD PRESSURE: 131 MMHG | HEIGHT: 64 IN | WEIGHT: 216.2 LBS

## 2019-06-11 DIAGNOSIS — G47.33 OBSTRUCTIVE SLEEP APNEA, ADULT: Primary | ICD-10-CM

## 2019-06-11 DIAGNOSIS — Z72.821 INADEQUATE SLEEP HYGIENE: ICD-10-CM

## 2019-06-11 PROCEDURE — 99204 OFFICE O/P NEW MOD 45 MIN: CPT | Performed by: INTERNAL MEDICINE

## 2019-06-11 NOTE — PROGRESS NOTES
New Patient Sleep Medicine Consultation    Encounter Date: 6/11/2019         Patient's PCP: Provider, No Known  Referring provider: No ref. provider found  Reason for consultation chief complaint: snoring and excessive daytime sleepiness    Kalyn Gilliland is a 54 y.o. female who admits to snoring, unrestful sleep, High blod pressure, gasping during sleep, decreased libido, excessive daytime sleepiness, stop breathing during sleep, choking duing sleep, Up to the bathroom at night, sleepy driving, abnormal or violent dreams, restless legs at night, difficulty falling asleep and difficulty staying asleep. She denies cataplexy, sleep paralysis, or hypnagogic hallucinations. Her bedtime is not set. She  falls asleep after 5-65 minutes, and is up 3-4 times per night. She wakes up ~ 0200. She endorses 2-4 hours of sleep. She drinks 4 cups of coffee, 0 teas, and 2L of sodas per day. She drinks 0 alcoholic beverages per week. She is a current smoker. She does pain medication and antidepressants or hypnotics. She has some sleepiness with driving. She naps throughout the day.    Theodore - 19    Past Medical History:   Diagnosis Date   • Arthropathy of lumbar facet joint    • Cervical spondylosis without myelopathy    • Chronic cholecystitis    • Long term use of drug    • Lumbosacral radiculitis    • Myofascial pain    • Sebaceous cyst    • Trochanteric bursitis      Social History     Socioeconomic History   • Marital status: Single     Spouse name: Not on file   • Number of children: Not on file   • Years of education: Not on file   • Highest education level: Not on file   Tobacco Use   • Smoking status: Current Every Day Smoker     Types: Cigarettes   • Smokeless tobacco: Never Used   Substance and Sexual Activity   • Alcohol use: No   • Drug use: No   • Sexual activity: Defer     Family History   Problem Relation Age of Onset   • Cancer Other    • Diabetes Other    • Heart disease Other    • Hyperlipidemia Other    •  "Hypertension Other    • Thyroid disease Other    • Other Other         Headaches     Review of Systems:  Constitutional: negative  Eyes: negative  Ears, nose, mouth, throat, and face: negative  Respiratory: negative  Cardiovascular: negative  Gastrointestinal: negative  Genitourinary:negative  Integument/breast: negative  Hematologic/lymphatic: negative  Musculoskeletal:negative  Neurological: negative  Behavioral/Psych: negative  Endocrine: negative  Allergic/Immunologic: negative Patient advised to discuss any positive ROS with PCP.      Vitals:    06/11/19 1412   BP: 131/83   Pulse: 76   SpO2: 97%           06/11/19  1412   Weight: 98.1 kg (216 lb 3.2 oz)       Body mass index is 37.09 kg/m². Patient's Body mass index is 37.09 kg/m². BMI is above normal parameters. Recommendations include: referral to primary care.    Neck circumference: 16.5\"          General: Alert. Cooperative. Well developed. No acute distress.             Head:  Normocephalic. Symmetrical. Atraumatic.              Eyes: Sclera clear. No icterus. PERRLA. Normal EOM.             Ears: No deformities. Normal hearing.             Nose: No septal deviation. No drainage.          Throat: No oral lesions. No thrush. Moist mucous membranes. Trachea midline    Tongue is enlarged    Dentition is poor       Pharynx: Posterior pharyngeal pillars are narrow    Mallampati score of IV (only hard palate visible)    Pharynx is normal with unrermarkable tonsils   Chest Wall:  Normal shape. Symmetric expansion with respiration. No tenderness.          Lungs:  Clear to auscultation bilaterally. No wheezes. No rhonchi. No rales. Respirations regular, even and unlabored.            Heart:  Regular rhythm and normal rate. Normal S1 and S2. No murmur.     Abdomen:  Soft, non-tender and non-distended. Normal bowel sounds. No masses.  Extremities:  Moves all extremities well. No edema.           Pulses: Pulses palpable and equal bilaterally.               Skin: Dry. " Intact. No bleeding. No rash.           Neuro: Moves all 4 extremities and cranial nerves grossly intact.  Psychiatric: Normal mood and affect.      Current Outpatient Medications:   •  citalopram (CeleXA) 40 MG tablet, Take 40 mg by mouth daily., Disp: , Rfl:   •  hydrochlorothiazide (MICROZIDE) 12.5 MG capsule, Take 12.5 mg by mouth daily., Disp: , Rfl:   •  HYDROcodone-acetaminophen (NORCO) 7.5-325 MG per tablet, Take 1 tablet by mouth 2 (Two) Times a Day., Disp: , Rfl:   •  lisinopril (PRINIVIL,ZESTRIL) 20 MG tablet, Take 20 mg by mouth daily., Disp: , Rfl:   •  loratadine (CLARITIN) 10 MG tablet, Take 10 mg by mouth daily., Disp: , Rfl:   •  omeprazole (priLOSEC) 40 MG capsule, TAKE 1 CAPSULE BY MOUTH EVERY DAY, Disp: , Rfl:   •  sucralfate (CARAFATE) 1 g tablet, Take 1 tablet by mouth 4 (Four) Times a Day., Disp: 120 tablet, Rfl: 3    WBC   Date Value Ref Range Status   07/14/2015 12.2 (H) 3.2 - 9.8 x1000/uL Final     RBC   Date Value Ref Range Status   07/14/2015 5.12 3.77 - 5.16 rolando/mm3 Final     Hemoglobin   Date Value Ref Range Status   07/14/2015 16.3 (H) 12.0 - 15.5 gm/dl Final     Hematocrit   Date Value Ref Range Status   07/14/2015 45.6 (H) 35.0 - 45.0 % Final     MCV   Date Value Ref Range Status   07/14/2015 89.1 80.0 - 98.0 fl Final     MCH   Date Value Ref Range Status   07/14/2015 31.8 26.0 - 34.0 pg Final     MCHC   Date Value Ref Range Status   07/14/2015 35.7 31.4 - 36.0 gm/dl Final     RDW   Date Value Ref Range Status   07/14/2015 13.2 11.5 - 14.5 % Final     MPV   Date Value Ref Range Status   07/14/2015 11.8 8.0 - 12.0 fl Final     Platelets   Date Value Ref Range Status   07/14/2015 242 150 - 450 x1000/mm3 Final     Neutrophil Rel %   Date Value Ref Range Status   07/14/2015 80.2 (H) 37.0 - 80.0 % Final     Lymphocyte Rel %   Date Value Ref Range Status   07/14/2015 14.5 10.0 - 50.0 % Final     Monocyte Rel %   Date Value Ref Range Status   07/14/2015 4.1 0.0 - 12.0 % Final      Eosinophil Rel %   Date Value Ref Range Status   07/14/2015 0.5 0.0 - 7.0 % Final     Basophil Rel %   Date Value Ref Range Status   07/14/2015 0.4 0.0 - 2.0 % Final     Immature Granulocyte Rel %   Date Value Ref Range Status   07/14/2015 0.30 0.00 - 0.50 % Final     Neutrophils Absolute   Date Value Ref Range Status   07/14/2015 9.77 (H) 2.00 - 8.60 x1000/uL Final     Lymphocytes Absolute   Date Value Ref Range Status   07/14/2015 1.77 0.60 - 4.20 x1000/uL Final     Monocytes Absolute   Date Value Ref Range Status   07/14/2015 0.50 0.00 - 0.90 x1000/uL Final     Eosinophils Absolute   Date Value Ref Range Status   07/14/2015 0.06 0.00 - 0.70 x1000/uL Final     Basophils Absolute   Date Value Ref Range Status   07/14/2015 0.05 0.00 - 0.20 x1000/uL Final     Immature Granulocytes Absolute   Date Value Ref Range Status   07/14/2015 0.040 (H) 0.005 - 0.022 x1000/uL Final     nRBC   Date Value Ref Range Status   07/14/2015 0.0 0.0 - 0.2 % Final   07/14/2015 0.000 x1000/uL Final       Contraindications to home sleep test: Circadian rhythm disorders including shift work sleep disorder, Parasomnias like sleepwalking, sleep talking, or REM behavior disorder, Ongoing narcotic therapy especially with infusion pumps and Retrognathia    ASSESSMENT:  1. Obstructive sleep apnea New, further workup planned (4)  1. Check split night polysomnography   2. Restless leg syndrome/ /Gilliland-Ekbom disease (RLS/WED) New, further workup planned (4)  1. Check ferritin level, consider Requip or Mirapex after PSG  3. 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 Discussed at length  4. Insomnia - sleep onset and maintenance Established, not controlled (2)   5. Poor sleep hygiene New, no further w/u planned (3)   1. Discussed cats in bed  6. Caffeine excess - New, no further w/u planned (3)  1. recommend reducing caffeine intake over time to no more than (3) caffeine beverages per day, all before  4pm.    RTC 2 weeks after testing         This document has been electronically signed by Julio Pearce MD on June 11, 2019         CC: Provider, No Known          No ref. provider found

## 2019-07-02 ENCOUNTER — OFFICE VISIT (OUTPATIENT)
Dept: GASTROENTEROLOGY | Facility: CLINIC | Age: 54
End: 2019-07-02

## 2019-07-02 VITALS
WEIGHT: 222.4 LBS | HEART RATE: 74 BPM | HEIGHT: 64 IN | SYSTOLIC BLOOD PRESSURE: 122 MMHG | DIASTOLIC BLOOD PRESSURE: 62 MMHG | BODY MASS INDEX: 37.97 KG/M2

## 2019-07-02 DIAGNOSIS — K21.00 GASTROESOPHAGEAL REFLUX DISEASE WITH ESOPHAGITIS: Primary | ICD-10-CM

## 2019-07-02 PROCEDURE — 99213 OFFICE O/P EST LOW 20 MIN: CPT | Performed by: NURSE PRACTITIONER

## 2019-07-02 RX ORDER — OMEPRAZOLE 40 MG/1
40 CAPSULE, DELAYED RELEASE ORAL DAILY
Qty: 30 CAPSULE | Refills: 11 | Status: SHIPPED | OUTPATIENT
Start: 2019-07-02 | End: 2021-06-15

## 2019-07-02 NOTE — PROGRESS NOTES
Chief Complaint   Patient presents with   • Heartburn       Subjective    Kalyn Gilliland is a 54 y.o. female. she is here today for follow-up.    History of Present Illness    54-year-old female presents to discuss abdominal pain and esophagitis.  At last visit we placed her on Carafate and states symptoms have greatly improved she is able to eat regularly and is actually up 10 pounds from last office visit.  Denies any nausea or vomiting.  Currently only taking   Carafate on an as-needed basis.  Reports her bowel movements have been somewhat constipated with Carafate along with Norco, she also takes cbd for pain. Reports she eats a lot of citrus fruits to help with constipation.  Previous EGD noted mildly severe esophagitis, gastritis and normal duodenum.  Her colonoscopy had villous adenoma tubular adenomas which were large and she has a family history of colon cancer in her grandmother.  Plan; continue with Carafate as needed basis.  Continue omeprazole daily, avoidance of gastric irritants and follow standard anti-reflex measures. follow-up in 9 months to plan repeat colonoscopy in 1 year due to large villous adenoma of sigmoid colon and family history of colon cancer.  Return to GI office sooner if needed.     The following portions of the patient's history were reviewed and updated as appropriate:   Past Medical History:   Diagnosis Date   • Arthropathy of lumbar facet joint    • Cervical spondylosis without myelopathy    • Chronic cholecystitis    • Long term use of drug    • Lumbosacral radiculitis    • Myofascial pain    • Sebaceous cyst    • Trochanteric bursitis      Past Surgical History:   Procedure Laterality Date   • COLONOSCOPY N/A 4/30/2019    Procedure: COLONOSCOPY;  Surgeon: Navid Swift MD;  Location: [facility] ENDOSCOPY;  Service: Gastroenterology   • CYST REMOVAL  10/18/2015    EXC TR-EXT Benign+Hue 2.1-3 CM 30447 (Sebaceous cyst)    • CYSTOCELE REPAIR  10/18/2015    Repair Superficial  Wound TR-EXT 2.5 < CM 32518 (Sebaceous cyst)    • ENDOSCOPY N/A 4/30/2019    Procedure: ESOPHAGOGASTRODUODENOSCOPY;  Surgeon: Navid Swift MD;  Location: [facility] ENDOSCOPY;  Service: Gastroenterology     Family History   Problem Relation Age of Onset   • Cancer Other    • Diabetes Other    • Heart disease Other    • Hyperlipidemia Other    • Hypertension Other    • Thyroid disease Other    • Other Other         Headaches     OB History     No data available        Prior to Admission medications    Medication Sig Start Date End Date Taking? Authorizing Provider   citalopram (CeleXA) 40 MG tablet Take 40 mg by mouth daily.   Yes ProviderSanjay MD   hydrochlorothiazide (MICROZIDE) 12.5 MG capsule Take 12.5 mg by mouth daily.   Yes ProviderSanjay MD   HYDROcodone-acetaminophen (NORCO) 7.5-325 MG per tablet Take 1 tablet by mouth 2 (Two) Times a Day.   Yes ProviderSanjay MD   lisinopril (PRINIVIL,ZESTRIL) 20 MG tablet Take 20 mg by mouth daily.   Yes ProviderSanjay MD   loratadine (CLARITIN) 10 MG tablet Take 10 mg by mouth daily.   Yes Provider, MD Sanjay   omeprazole (priLOSEC) 40 MG capsule TAKE 1 CAPSULE BY MOUTH EVERY DAY 2/6/18  Yes ProviderSanjay MD   sucralfate (CARAFATE) 1 g tablet Take 1 tablet by mouth 4 (Four) Times a Day. 5/7/19  Yes Caity Dias APRN     Allergies   Allergen Reactions   • Erythromycin Base Coated [Erythromycin]      Social History     Socioeconomic History   • Marital status: Single     Spouse name: Not on file   • Number of children: Not on file   • Years of education: Not on file   • Highest education level: Not on file   Tobacco Use   • Smoking status: Current Every Day Smoker     Types: Cigarettes   • Smokeless tobacco: Never Used   Substance and Sexual Activity   • Alcohol use: No   • Drug use: No   • Sexual activity: Defer       Review of Systems  Review of Systems   Constitutional: Negative for activity change, appetite change, chills,  "diaphoresis, fatigue, fever and unexpected weight change.   HENT: Negative for sore throat and trouble swallowing.    Respiratory: Negative for shortness of breath.    Gastrointestinal: Positive for abdominal pain (better ) and constipation. Negative for abdominal distention, anal bleeding, blood in stool, diarrhea, nausea, rectal pain and vomiting.   Musculoskeletal: Negative for arthralgias.   Skin: Negative for pallor.   Neurological: Negative for light-headedness.        /62 (BP Location: Left arm)   Pulse 74   Ht 162.6 cm (64\")   Wt 101 kg (222 lb 6.4 oz)   BMI 38.17 kg/m²     Objective    Physical Exam   Constitutional: She is oriented to person, place, and time. She appears well-developed and well-nourished. She is cooperative. No distress.   HENT:   Head: Normocephalic and atraumatic.   Neck: Normal range of motion. Neck supple. No thyromegaly present.   Cardiovascular: Normal rate, regular rhythm and normal heart sounds.   Pulmonary/Chest: Effort normal and breath sounds normal. She has no wheezes. She has no rhonchi. She has no rales.   Abdominal: Soft. Normal appearance and bowel sounds are normal. She exhibits no distension. There is no hepatosplenomegaly. There is tenderness in the epigastric area and left upper quadrant. There is no rigidity and no guarding. No hernia.   Lymphadenopathy:     She has no cervical adenopathy.   Neurological: She is alert and oriented to person, place, and time.   Skin: Skin is warm, dry and intact. No rash noted. No pallor.   Psychiatric: She has a normal mood and affect. Her speech is normal.     Admission on 04/30/2019, Discharged on 04/30/2019   Component Date Value Ref Range Status   • Case Report 04/30/2019    Final                    Value:Surgical Pathology Report                         Case: [id number]                                  Authorizing Provider:  Navid Swift MD        Collected:           04/30/2019 02:18 PM          Ordering Location:   "   [facility]             Received:            05/01/2019 06:34 AM                                 [place] [facility]                                                     Pathologist:           Devyn Barrett MD                                                         Specimens:   1) - Gastric, Antrum, antrum bx                                                                     2) - Esophagus, Distal, distal esophagus bx                                                         3) - Large Intestine, Sigmoid Colon, sigmoid colon polyps X 2 (hot snare)                 • Final Diagnosis 04/30/2019    Final                    Value:This result contains rich text formatting which cannot be displayed here.   • Gross Description 04/30/2019    Final                    Value:This result contains rich text formatting which cannot be displayed here.     Assessment/Plan      1. Gastroesophageal reflux disease with esophagitis    .       Orders placed during this encounter include:  No orders of the defined types were placed in this encounter.      * Surgery not found *    Review and/or summary of lab tests, radiology, procedures, medications. Review and summary of old records and obtaining of history. The risks and benefits of my recommendations, as well as other treatment options were discussed with the patient today. Questions were answered.    New Medications Ordered This Visit   Medications   • omeprazole (priLOSEC) 40 MG capsule     Sig: Take 1 capsule by mouth Daily.     Dispense:  30 capsule     Refill:  11       Follow-up: Return in about 9 months (around 4/2/2020).          This document has been electronically signed by YEIMY Bateman on July 2, 2019 1:43 PM             Results for orders placed or performed during the hospital encounter of 04/30/19   Tissue Pathology Exam   Result Value Ref Range    Case Report       Surgical Pathology Report                         Case: [id number]                        "           Authorizing Provider:  Navid Swift MD        Collected:           04/30/2019 02:18 PM          Ordering Location:     [facility]             Received:            05/01/2019 06:34 AM                                 [place] [facility]                                                     Pathologist:           Devyn Barrett MD                                                         Specimens:   1) - Gastric, Antrum, antrum bx                                                                     2) - Esophagus, Distal, distal esophagus bx                                                         3) - Large Intestine, Sigmoid Colon, sigmoid colon polyps X 2 (hot snare)                  Final Diagnosis       1.  MUCOSA, ANTRUM OF STOMACH:   REACTIVE GASTROPATHY.    2.  MUCOSA, DISTAL ESOPHAGUS:   REACTIVE CHANGE OF SQUAMOUS MUCOSA.     3.  POLYPS (2), SIGMOID COLON:   VILLOUS ADENOMA.   TUBULAR ADENOMA.       Gross Description       1.  The first container is labeled \"antrum of stomach\" and has a nodular fragment of white soft tissue measuring 0.4 cm in greatest dimension.  The entire specimen is embedded as 1A.    2.  The second container is labeled \"distal esophagus\" and has nodular bits of white soft tissue measuring 0.4 cc in aggregate.  The entire specimen is embedded as 2A.    3.  The third container is labeled \"polyps (2), sigmoid colon\" and has 2 brown polypoid fragments of tissue measuring 2.2 x 1.3 x 0.9 cm and 0.4 x 0.3 x 0.2 cm.  All specimens are entirely embedded as 3A.      Results for orders placed or performed in visit on 11/07/17   [employer] Select 13 (MW) - Urine, Clean Catch   Result Value Ref Range    Report Summary FINAL    Results for orders placed or performed in visit on 06/26/17   [employer]ox[employer] Select 13 (MW)   Result Value Ref Range    Report Summary FINAL    Results for orders placed or performed during the hospital encounter of 09/07/16   Pain Management Profile (13 Drugs) " Urine   Result Value Ref Range    Pain Management Drug Panel See Below     Codeine, Urine Not Detected     Morphine, Urine Present     6-acetylmorphine Not Detected     Oxycodone Screen, Urine Not Detected     Noroxycodone Not Detected     Oxymorphone UR Not Detected     Noroxymorphone Not Detected     Hydrocodone UR Not Detected     Norhydrocodone Not Detected     Hydromorphone Not Detected     Buprenorphine, Urine Not Detected     Norbuprenorphine Not Detected     Fentanyl, Urine Not Detected     Norfentanyl Not Detected     Normeperidine, Urine Not Detected     Tapentadol Not Detected     Tapentadol-o-Sulf Not Detected     Methadone Screen, Urine Not Detected     Propoxyphene Screen Not Detected     Tramadol Screen, Urine Not Detected     Amphetamine, Urine Qual Not Detected     Methamphetamine, Urine Not Detected     MDMA-Ecstasy Not Detected     MDA Not Detected     MDEA Not Detected     Phentermine Urine Not Detected     Benzoylecgonine, Urine Not Detected     Alprazolam Urine, Conf Not Detected     alpha-Hydroxyalprazolam, Quant, Urine Not Detected     Clonazepam Not Detected     7-Aminoclonazepam Urine Not Detected     Diazepam Urine, Qualitative Not Detected     Nordiazepam, Urine Not Detected     Oxazepam, urine Not Detected     Temazepam, urine Not Detected     Lorazepam Not Detected     Midazolam, Urine Not Detected     Zolpidem(Ambien) Urine Not Detected     Barbiturates Screen, Urine Not Detected     Ethyl Glucuronide Not Detected     Marijuana Metabolite Not Detected     Phencyclidine (PCP), Urine Not Detected     Carisoprodol, Serum Not Detected     Creatinine, Urine 39.1 20.0 - 400.0 mg/dL    Pain Management Drug Panel See Note     Methylphenidate Not Detected    Results for orders placed or performed during the hospital encounter of 02/17/16   Pain Management Profile (13 Drugs) Urine   Result Value Ref Range    Pain Management Drug Panel See Below     Codeine, Urine Not Detected     Morphine, Urine  Present     6-acetylmorphine Not Detected     Oxycodone Screen, Urine Not Detected     Noroxycodone Not Detected     Oxymorphone UR Not Detected     Noroxymorphone Not Detected     Hydrocodone UR Not Detected     Norhydrocodone Not Detected     Hydromorphone Not Detected     Buprenorphine, Urine Not Detected     Norbuprenorphine Not Detected     Fentanyl, Urine Not Detected     Norfentanyl Not Detected     Normeperidine, Urine Not Detected     Tapentadol Not Detected     Tapentadol-o-Sulf Not Detected     Methadone Screen, Urine Not Detected     Propoxyphene Screen Not Detected     Tramadol Screen, Urine Not Detected     Amphetamine, Urine Qual Present     Methamphetamine, Urine Not Detected     MDMA-Ecstasy Not Detected     MDA Not Detected     MDEA Not Detected     Ritalinic Acid, Urine Not Detected     Phentermine Urine Not Detected     Benzoylecgonine, Urine Not Detected     Alprazolam Urine, Conf Not Detected     alpha-Hydroxyalprazolam, Quant, Urine Not Detected     Clonazepam Not Detected     7-Aminoclonazepam Urine Not Detected     Diazepam Urine, Qualitative Not Detected     Nordiazepam, Urine Not Detected     Oxazepam, urine Not Detected     Temazepam, urine Not Detected     Lorazepam Not Detected     Midazolam, Urine Not Detected     Zolpidem(Ambien) Urine Not Detected     Barbiturates Screen, Urine Not Detected     Ethyl Glucuronide Not Detected     Marijuana Metabolite Not Detected     Phencyclidine (PCP), Urine Not Detected     Carisoprodol, Serum Not Detected     Creatinine, Urine 129.9 20.0 - 400.0 mg/dL    Pain Management Drug Panel See Note    Results for orders placed or performed during the hospital encounter of 09/21/15   Converted Surgical Pathology   Result Value Ref Range    Spec Descr 1 SPECIMEN(S): A LESION, LEFT FOREARM      *Note: Due to a large number of results and/or encounters for the requested time period, some results have not been displayed. A complete set of results can be found  in Results Review.

## 2019-07-02 NOTE — PATIENT INSTRUCTIONS
Heartburn  Heartburn is a type of pain or discomfort that can happen in the throat or chest. It is often described as a burning pain. It may also cause a bad taste in the mouth. Heartburn may feel worse when you lie down or bend over, and it is often worse at night. Heartburn may be caused by stomach contents that move back up into the esophagus (reflux).  Follow these instructions at home:  Take these actions to decrease your discomfort and to help avoid complications.  Diet  · Follow a diet as recommended by your health care provider. This may involve avoiding foods and drinks such as:  ? Coffee and tea (with or without caffeine).  ? Drinks that contain alcohol.  ? Energy drinks and sports drinks.  ? Carbonated drinks or sodas.  ? Chocolate and cocoa.  ? Peppermint and mint flavorings.  ? Garlic and onions.  ? Horseradish.  ? Spicy and acidic foods, including peppers, chili powder, patel powder, vinegar, hot sauces, and barbecue sauce.  ? Citrus fruit juices and citrus fruits, such as oranges, lavon, and limes.  ? Tomato-based foods, such as red sauce, chili, salsa, and pizza with red sauce.  ? Fried and fatty foods, such as donuts, french fries, potato chips, and high-fat dressings.  ? High-fat meats, such as hot dogs and fatty cuts of red and white meats, such as rib eye steak, sausage, ham, and west.  ? High-fat dairy items, such as whole milk, butter, and cream cheese.  · Eat small, frequent meals instead of large meals.  · Avoid drinking large amounts of liquid with your meals.  · Avoid eating meals during the 2-3 hours before bedtime.  · Avoid lying down right after you eat.  · Do not exercise right after you eat.  General instructions  · Pay attention to any changes in your symptoms.  · Take over-the-counter and prescription medicines only as told by your health care provider. Do not take aspirin, ibuprofen, or other NSAIDs unless your health care provider told you to do so.  · Do not use any tobacco  products, including cigarettes, chewing tobacco, and e-cigarettes. If you need help quitting, ask your health care provider.  · Wear loose-fitting clothing. Do not wear anything tight around your waist that causes pressure on your abdomen.  · Raise (elevate) the head of your bed about 6 inches (15 cm).  · Try to reduce your stress, such as with yoga or meditation. If you need help reducing stress, ask your health care provider.  · If you are overweight, reduce your weight to an amount that is healthy for you. Ask your health care provider for guidance about a safe weight loss goal.  · Keep all follow-up visits as told by your health care provider. This is important.  Contact a health care provider if:  · You have new symptoms.  · You have unexplained weight loss.  · You have difficulty swallowing, or it hurts to swallow.  · You have wheezing or a persistent cough.  · Your symptoms do not improve with treatment.  · You have frequent heartburn for more than two weeks.  Get help right away if:  · You have pain in your arms, neck, jaw, teeth, or back.  · You feel sweaty, dizzy, or light-headed.  · You have chest pain or shortness of breath.  · You vomit and your vomit looks like blood or coffee grounds.  · Your stool is bloody or black.  This information is not intended to replace advice given to you by your health care provider. Make sure you discuss any questions you have with your health care provider.  Document Released: 05/06/2010 Document Revised: 05/25/2017 Document Reviewed: 04/13/2016  [employer] Patient Education © 2019 [employer]

## 2020-07-08 RX ORDER — OMEPRAZOLE 40 MG/1
CAPSULE, DELAYED RELEASE ORAL
Qty: 90 CAPSULE | Refills: 3 | OUTPATIENT
Start: 2020-07-08

## 2021-06-15 ENCOUNTER — OFFICE VISIT (OUTPATIENT)
Dept: GASTROENTEROLOGY | Facility: CLINIC | Age: 56
End: 2021-06-15

## 2021-06-15 VITALS
DIASTOLIC BLOOD PRESSURE: 81 MMHG | SYSTOLIC BLOOD PRESSURE: 144 MMHG | HEART RATE: 75 BPM | BODY MASS INDEX: 36.37 KG/M2 | HEIGHT: 64 IN | WEIGHT: 213 LBS

## 2021-06-15 DIAGNOSIS — K62.5 RECTAL BLEEDING: ICD-10-CM

## 2021-06-15 DIAGNOSIS — Z86.010 PERSONAL HISTORY OF COLONIC POLYPS: Primary | ICD-10-CM

## 2021-06-15 PROBLEM — J30.2 SEASONAL ALLERGIES: Status: ACTIVE | Noted: 2021-06-15

## 2021-06-15 PROBLEM — K21.9 GASTROESOPHAGEAL REFLUX DISEASE: Status: ACTIVE | Noted: 2021-06-15

## 2021-06-15 PROBLEM — H10.9 CONJUNCTIVITIS: Status: ACTIVE | Noted: 2021-06-15

## 2021-06-15 PROBLEM — J30.9 ALLERGIC RHINITIS: Status: ACTIVE | Noted: 2021-06-15

## 2021-06-15 PROBLEM — E78.5 HYPERLIPIDEMIA: Status: ACTIVE | Noted: 2021-06-15

## 2021-06-15 PROBLEM — G47.30 SLEEP APNEA: Status: ACTIVE | Noted: 2021-06-15

## 2021-06-15 PROBLEM — M54.30 SCIATICA: Status: ACTIVE | Noted: 2021-06-15

## 2021-06-15 PROBLEM — M54.2 NECK PAIN: Status: ACTIVE | Noted: 2021-06-15

## 2021-06-15 PROBLEM — F32.A DEPRESSION: Status: ACTIVE | Noted: 2021-06-15

## 2021-06-15 PROBLEM — H60.399 INFECTIVE OTITIS EXTERNA: Status: ACTIVE | Noted: 2021-06-15

## 2021-06-15 PROBLEM — I10 ESSENTIAL HYPERTENSION: Status: ACTIVE | Noted: 2021-06-15

## 2021-06-15 PROBLEM — K21.00 GASTRO-ESOPHAGEAL REFLUX DISEASE WITH ESOPHAGITIS: Status: ACTIVE | Noted: 2021-06-15

## 2021-06-15 PROBLEM — J20.9 ACUTE BRONCHITIS: Status: ACTIVE | Noted: 2021-06-15

## 2021-06-15 PROCEDURE — 99213 OFFICE O/P EST LOW 20 MIN: CPT | Performed by: NURSE PRACTITIONER

## 2021-06-15 RX ORDER — DEXTROSE AND SODIUM CHLORIDE 5; .45 G/100ML; G/100ML
30 INJECTION, SOLUTION INTRAVENOUS CONTINUOUS PRN
Status: CANCELLED | OUTPATIENT
Start: 2021-06-21

## 2021-06-15 RX ORDER — SERTRALINE HYDROCHLORIDE 100 MG/1
TABLET, FILM COATED ORAL
COMMUNITY
Start: 2021-05-18 | End: 2021-06-15

## 2021-06-15 RX ORDER — SODIUM, POTASSIUM,MAG SULFATES 17.5-3.13G
1 SOLUTION, RECONSTITUTED, ORAL ORAL EVERY 12 HOURS
Qty: 177 ML | Refills: 0 | Status: SHIPPED | OUTPATIENT
Start: 2021-06-15 | End: 2021-07-12 | Stop reason: SINTOL

## 2021-06-15 RX ORDER — SERTRALINE HYDROCHLORIDE 100 MG/1
100 TABLET, FILM COATED ORAL DAILY
COMMUNITY

## 2021-06-15 NOTE — PROGRESS NOTES
Chief Complaint   Patient presents with   • Black or Bloody Stool       Subjective    Kalyn Gilliland is a 56 y.o. female. she is here today for follow-up.    History of Present Illness  56-year-old female presents to discuss colonoscopy and recent rectal bleeding.  She had colonoscopy in 2019 with repeat recommended in 1 year due to multiple colonic polyps but did not schedule due to Covid pandemic.  Reports she had bright red blood within her stool a few days ago and has felt more tired than usual.  Denies any nausea or vomiting.  She denies any changes in her bowel movement otherwise.  She has family history of colon cancer in her grandmother.  Plan; schedule patient for colonoscopy due to recent episode of rectal bleeding and history of villous adenoma and tubular adenomas at sigmoid colon.     The following portions of the patient's history were reviewed and updated as appropriate:   Past Medical History:   Diagnosis Date   • Arthropathy of lumbar facet joint    • Asthma    • Cervical spondylosis without myelopathy    • Chronic cholecystitis    • Hypertension    • Long term use of drug    • Lumbosacral radiculitis    • Myofascial pain    • Sebaceous cyst    • Trochanteric bursitis      Past Surgical History:   Procedure Laterality Date   • CHOLECYSTECTOMY     • COLONOSCOPY N/A 4/30/2019    Procedure: COLONOSCOPY;  Surgeon: Navid Swift MD;  Location: [facility] ENDOSCOPY;  Service: Gastroenterology   • CYST REMOVAL  10/18/2015    EXC TR-EXT Benign+Hue 2.1-3 CM 16426 (Sebaceous cyst)    • CYSTOCELE REPAIR  10/18/2015    Repair Superficial Wound TR-EXT 2.5 < CM 25260 (Sebaceous cyst)    • ENDOSCOPY N/A 4/30/2019    Procedure: ESOPHAGOGASTRODUODENOSCOPY;  Surgeon: Navid Swift MD;  Location: [facility] ENDOSCOPY;  Service: Gastroenterology   • LUMBAR DISCECTOMY     • TONSILLECTOMY       Family History   Problem Relation Age of Onset   • Cancer Other    • Diabetes Other    • Heart disease Other    •  Hyperlipidemia Other    • Hypertension Other    • Thyroid disease Other    • Other Other         Headaches     OB History    No obstetric history on file.       Prior to Admission medications    Medication Sig Start Date End Date Taking? Authorizing Provider   Calcium Carb-Cholecalciferol 600-1000 MG-UNIT tablet Take  by mouth.   Yes Sanjay Gregory MD   Diclofenac Sodium (VOLTAREN) 1 % gel gel APPLY TO AFFECTED AREA 3 TIMES A DAY 2/24/21  Yes Sanjay Gregory MD   hydrochlorothiazide (MICROZIDE) 12.5 MG capsule Take 12.5 mg by mouth daily.   Yes Sanjay Gregory MD   HYDROcodone-acetaminophen (NORCO) 7.5-325 MG per tablet Take 1 tablet by mouth 2 (Two) Times a Day.   Yes Sanajy Gregory MD   lisinopril (PRINIVIL,ZESTRIL) 20 MG tablet Take 20 mg by mouth daily.   Yes Sanjay Gregory MD   loratadine (CLARITIN) 10 MG tablet Take 10 mg by mouth daily.   Yes Sanjay Gregory MD   sertraline (ZOLOFT) 100 MG tablet Take 100 mg by mouth Daily.   Yes Sanjay Gregory MD   omeprazole (priLOSEC) 40 MG capsule Take 1 capsule by mouth Daily. 7/2/19 6/15/21 Yes Caity Dias APRN   sucralfate (CARAFATE) 1 g tablet Take 1 tablet by mouth 4 (Four) Times a Day. 5/7/19 6/15/21 Yes Caity Dias APRN   citalopram (CeleXA) 40 MG tablet Take 40 mg by mouth daily.  6/15/21  Sanjay Gregory MD   sertraline (ZOLOFT) 100 MG tablet  5/18/21 6/15/21  Sanjay Gregory MD     Allergies   Allergen Reactions   • Erythromycin Base Coated [Erythromycin]      Social History     Socioeconomic History   • Marital status: Single     Spouse name: Not on file   • Number of children: Not on file   • Years of education: Not on file   • Highest education level: Not on file   Tobacco Use   • Smoking status: Current Every Day Smoker     Packs/day: 1.00     Years: 40.00     Pack years: 40.00     Types: Cigarettes   • Smokeless tobacco: Never Used   Vaping Use   • Vaping Use: Some days   • Substances: Nicotine  "  Substance and Sexual Activity   • Alcohol use: Yes     Comment: occasionally a glass of wine   • Drug use: No   • Sexual activity: Defer       Review of Systems  Review of Systems   Constitutional: Negative for activity change, appetite change, chills, diaphoresis, fatigue, fever and unexpected weight change.   HENT: Negative for sore throat and trouble swallowing.    Respiratory: Negative for shortness of breath.    Gastrointestinal: Positive for blood in stool. Negative for abdominal distention, abdominal pain, anal bleeding, constipation, diarrhea, nausea, rectal pain and vomiting.   Musculoskeletal: Negative for arthralgias.   Skin: Negative for pallor.   Neurological: Negative for light-headedness.        /81 (BP Location: Left arm)   Pulse 75   Ht 162.6 cm (64\")   Wt 96.6 kg (213 lb)   BMI 36.56 kg/m²     Objective    Physical Exam  Constitutional:       General: She is not in acute distress.     Appearance: Normal appearance. She is well-developed.   HENT:      Head: Normocephalic and atraumatic.   Neck:      Thyroid: No thyromegaly.   Cardiovascular:      Rate and Rhythm: Normal rate.   Pulmonary:      Effort: Pulmonary effort is normal.   Abdominal:      General: Bowel sounds are normal. There is no distension.      Palpations: Abdomen is soft. Abdomen is not rigid.      Tenderness: There is no abdominal tenderness. There is no guarding.      Hernia: No hernia is present.   Musculoskeletal:      Cervical back: Normal range of motion and neck supple.   Skin:     General: Skin is warm and dry.      Coloration: Skin is not pale.      Findings: No rash.   Neurological:      Mental Status: She is alert and oriented to person, place, and time.   Psychiatric:         Speech: Speech normal.         Behavior: Behavior is cooperative.       Admission on 04/30/2019, Discharged on 04/30/2019   Component Date Value Ref Range Status   • Case Report 04/30/2019    Final                    Value:Surgical " Pathology Report                         Case: [id number]                                  Authorizing Provider:  Navid Swift MD        Collected:           04/30/2019 02:18 PM          Ordering Location:     [facility]             Received:            05/01/2019 06:34 AM                                 [place] [facility]                                                     Pathologist:           Devyn Barrett MD                                                         Specimens:   1) - Gastric, Antrum, antrum bx                                                                     2) - Esophagus, Distal, distal esophagus bx                                                         3) - Large Intestine, Sigmoid Colon, sigmoid colon polyps X 2 (hot snare)                 • Final Diagnosis 04/30/2019    Final                    Value:This result contains rich text formatting which cannot be displayed here.   • Gross Description 04/30/2019    Final                    Value:This result contains rich text formatting which cannot be displayed here.     Assessment/Plan      1. Personal history of colonic polyps    2. Rectal bleeding    .       Orders placed during this encounter include:  Orders Placed This Encounter   Procedures   • Follow Anesthesia Guidelines / Standing Orders     Standing Status:   Future   • Obtain Informed Consent     Standing Status:   Future     Order Specific Question:   Informed Consent Given For     Answer:   COLONOSCOPY       COLONOSCOPY ASAP (N/A)    Review and/or summary of lab tests, radiology, procedures, medications. Review and summary of old records and obtaining of history. The risks and benefits of my recommendations, as well as other treatment options were discussed with the patient today. Questions were answered.    New Medications Ordered This Visit   Medications   • sodium-potassium-magnesium sulfates (Suprep Bowel Prep Kit) 17.5-3.13-1.6 GM/177ML solution oral solution      Sig: Take 1 bottle by mouth Every 12 (Twelve) Hours.     Dispense:  177 mL     Refill:  0       Follow-up: Return in about 4 weeks (around 7/13/2021) for Recheck, After test.          This document has been electronically signed by YEIMY Bateman on June 17, 2021 18:03 CDT           I spent 24 minutes caring for Kalyn on this date of service. This time includes time spent by me in the following activities:preparing for the visit, reviewing tests, obtaining and/or reviewing a separately obtained history, performing a medically appropriate examination and/or evaluation , counseling and educating the patient/family/caregiver, ordering medications, tests, or procedures, referring and communicating with other health care professionals , documenting information in the medical record and care coordination    Results for orders placed or performed during the hospital encounter of 04/30/19   Tissue Pathology Exam    Specimen: A: Gastric, Antrum; Tissue    B: Esophagus, Distal; Tissue    C: Large Intestine, Sigmoid Colon; Tissue   Result Value Ref Range    Case Report       Surgical Pathology Report                         Case: [id number]                                  Authorizing Provider:  Navid Swift MD        Collected:           04/30/2019 02:18 PM          Ordering Location:     [facility]             Received:            05/01/2019 06:34 AM                                 [place] [facility]                                                     Pathologist:           Devyn Barrett MD                                                         Specimens:   1) - Gastric, Antrum, antrum bx                                                                     2) - Esophagus, Distal, distal esophagus bx                                                         3) - Large Intestine, Sigmoid Colon, sigmoid colon polyps X 2 (hot snare)                  Final Diagnosis       1.  MUCOSA, ANTRUM OF STOMACH:  "  REACTIVE GASTROPATHY.    2.  MUCOSA, DISTAL ESOPHAGUS:   REACTIVE CHANGE OF SQUAMOUS MUCOSA.     3.  POLYPS (2), SIGMOID COLON:   VILLOUS ADENOMA.   TUBULAR ADENOMA.       Gross Description       1.  The first container is labeled \"antrum of stomach\" and has a nodular fragment of white soft tissue measuring 0.4 cm in greatest dimension.  The entire specimen is embedded as 1A.    2.  The second container is labeled \"distal esophagus\" and has nodular bits of white soft tissue measuring 0.4 cc in aggregate.  The entire specimen is embedded as 2A.    3.  The third container is labeled \"polyps (2), sigmoid colon\" and has 2 brown polypoid fragments of tissue measuring 2.2 x 1.3 x 0.9 cm and 0.4 x 0.3 x 0.2 cm.  All specimens are entirely embedded as 3A.      Results for orders placed or performed in visit on 11/07/17   Vatler Select 13 (MW) - Urine, Clean Catch    Specimen: Urine, Clean Catch   Result Value Ref Range    Report Summary FINAL    Results for orders placed or performed in visit on 06/26/17   Vatler Select 13 (MW)    Specimen: Urine, Clean Catch   Result Value Ref Range    Report Summary FINAL    Results for orders placed or performed during the hospital encounter of 09/07/16   Pain Management Profile (13 Drugs) Urine    Specimen: Urine   Result Value Ref Range    Pain Management Drug Panel See Below     Codeine, Urine Not Detected     Morphine, Urine Present     6-acetylmorphine Not Detected     Oxycodone Screen, Urine Not Detected     Noroxycodone Not Detected     Oxymorphone UR Not Detected     Noroxymorphone Not Detected     Hydrocodone UR Not Detected     Norhydrocodone Not Detected     Hydromorphone Not Detected     Buprenorphine, Urine Not Detected     Norbuprenorphine Not Detected     Fentanyl, Urine Not Detected     Norfentanyl Not Detected     Normeperidine, Urine Not Detected     Tapentadol Not Detected     Tapentadol-o-Sulf Not Detected     Methadone Screen, Urine Not Detected     " Propoxyphene Screen Not Detected     Tramadol Screen, Urine Not Detected     Amphetamine, Urine Qual Not Detected     Methamphetamine, Urine Not Detected     MDMA-Ecstasy Not Detected     MDA Not Detected     MDEA Not Detected     Phentermine Urine Not Detected     Benzoylecgonine, Urine Not Detected     Alprazolam Urine, Conf Not Detected     alpha-Hydroxyalprazolam, Quant, Urine Not Detected     Clonazepam Not Detected     7-Aminoclonazepam Urine Not Detected     Diazepam Urine, Qualitative Not Detected     Nordiazepam, Urine Not Detected     Oxazepam, urine Not Detected     Temazepam, urine Not Detected     Lorazepam Not Detected     Midazolam, Urine Not Detected     Zolpidem(Ambien) Urine Not Detected     Barbiturates Screen, Urine Not Detected     Ethyl Glucuronide Not Detected     Marijuana Metabolite Not Detected     Phencyclidine (PCP), Urine Not Detected     Carisoprodol, Serum Not Detected     Creatinine, Urine 39.1 20.0 - 400.0 mg/dL    Pain Management Drug Panel See Note     Methylphenidate Not Detected    Results for orders placed or performed during the hospital encounter of 02/17/16   Pain Management Profile (13 Drugs) Urine    Specimen: Urine   Result Value Ref Range    Pain Management Drug Panel See Below     Codeine, Urine Not Detected     Morphine, Urine Present     6-acetylmorphine Not Detected     Oxycodone Screen, Urine Not Detected     Noroxycodone Not Detected     Oxymorphone UR Not Detected     Noroxymorphone Not Detected     Hydrocodone UR Not Detected     Norhydrocodone Not Detected     Hydromorphone Not Detected     Buprenorphine, Urine Not Detected     Norbuprenorphine Not Detected     Fentanyl, Urine Not Detected     Norfentanyl Not Detected     Normeperidine, Urine Not Detected     Tapentadol Not Detected     Tapentadol-o-Sulf Not Detected     Methadone Screen, Urine Not Detected     Propoxyphene Screen Not Detected     Tramadol Screen, Urine Not Detected     Amphetamine, Urine Qual  Present     Methamphetamine, Urine Not Detected     MDMA-Ecstasy Not Detected     MDA Not Detected     MDEA Not Detected     Ritalinic Acid, Urine Not Detected     Phentermine Urine Not Detected     Benzoylecgonine, Urine Not Detected     Alprazolam Urine, Conf Not Detected     alpha-Hydroxyalprazolam, Quant, Urine Not Detected     Clonazepam Not Detected     7-Aminoclonazepam Urine Not Detected     Diazepam Urine, Qualitative Not Detected     Nordiazepam, Urine Not Detected     Oxazepam, urine Not Detected     Temazepam, urine Not Detected     Lorazepam Not Detected     Midazolam, Urine Not Detected     Zolpidem(Ambien) Urine Not Detected     Barbiturates Screen, Urine Not Detected     Ethyl Glucuronide Not Detected     Marijuana Metabolite Not Detected     Phencyclidine (PCP), Urine Not Detected     Carisoprodol, Serum Not Detected     Creatinine, Urine 129.9 20.0 - 400.0 mg/dL    Pain Management Drug Panel See Note    Results for orders placed or performed during the hospital encounter of 09/21/15   Converted Surgical Pathology    Specimen: Tissue   Result Value Ref Range    Spec Descr 1 SPECIMEN(S): A LESION, LEFT FOREARM      *Note: Due to a large number of results and/or encounters for the requested time period, some results have not been displayed. A complete set of results can be found in Results Review.

## 2021-06-17 RX ORDER — ATORVASTATIN CALCIUM 40 MG/1
40 TABLET, FILM COATED ORAL DAILY
COMMUNITY

## 2021-07-16 ENCOUNTER — HOSPITAL ENCOUNTER (OUTPATIENT)
Facility: HOSPITAL | Age: 56
Setting detail: HOSPITAL OUTPATIENT SURGERY
Discharge: HOME | End: 2021-07-16
Attending: INTERNAL MEDICINE | Admitting: INTERNAL MEDICINE

## 2021-07-16 ENCOUNTER — ANESTHESIA (OUTPATIENT)
Dept: GASTROENTEROLOGY | Facility: HOSPITAL | Age: 56
End: 2021-07-16

## 2021-07-16 ENCOUNTER — ANESTHESIA EVENT (OUTPATIENT)
Dept: GASTROENTEROLOGY | Facility: HOSPITAL | Age: 56
End: 2021-07-16

## 2021-07-16 VITALS
BODY MASS INDEX: 35.44 KG/M2 | RESPIRATION RATE: 18 BRPM | OXYGEN SATURATION: 96 % | TEMPERATURE: 96.9 F | WEIGHT: 207.6 LBS | HEIGHT: 64 IN | DIASTOLIC BLOOD PRESSURE: 66 MMHG | HEART RATE: 73 BPM | SYSTOLIC BLOOD PRESSURE: 122 MMHG

## 2021-07-16 DIAGNOSIS — Z86.010 PERSONAL HISTORY OF COLONIC POLYPS: ICD-10-CM

## 2021-07-16 DIAGNOSIS — K62.5 RECTAL BLEEDING: ICD-10-CM

## 2021-07-16 PROCEDURE — 25010000002 PROPOFOL 10 MG/ML EMULSION: Performed by: NURSE ANESTHETIST, CERTIFIED REGISTERED

## 2021-07-16 PROCEDURE — 88305 TISSUE EXAM BY PATHOLOGIST: CPT

## 2021-07-16 PROCEDURE — 45380 COLONOSCOPY AND BIOPSY: CPT | Performed by: INTERNAL MEDICINE

## 2021-07-16 RX ORDER — MEPERIDINE HYDROCHLORIDE 25 MG/ML
12.5 INJECTION INTRAMUSCULAR; INTRAVENOUS; SUBCUTANEOUS
Status: DISCONTINUED | OUTPATIENT
Start: 2021-07-16 | End: 2021-07-16 | Stop reason: HOSPADM

## 2021-07-16 RX ORDER — ONDANSETRON 2 MG/ML
4 INJECTION INTRAMUSCULAR; INTRAVENOUS ONCE AS NEEDED
Status: DISCONTINUED | OUTPATIENT
Start: 2021-07-16 | End: 2021-07-16 | Stop reason: HOSPADM

## 2021-07-16 RX ORDER — DEXTROSE AND SODIUM CHLORIDE 5; .45 G/100ML; G/100ML
30 INJECTION, SOLUTION INTRAVENOUS CONTINUOUS PRN
Status: DISCONTINUED | OUTPATIENT
Start: 2021-07-16 | End: 2021-07-16 | Stop reason: HOSPADM

## 2021-07-16 RX ORDER — PROMETHAZINE HYDROCHLORIDE 25 MG/1
25 SUPPOSITORY RECTAL ONCE AS NEEDED
Status: DISCONTINUED | OUTPATIENT
Start: 2021-07-16 | End: 2021-07-16 | Stop reason: HOSPADM

## 2021-07-16 RX ORDER — PROPOFOL 10 MG/ML
VIAL (ML) INTRAVENOUS AS NEEDED
Status: DISCONTINUED | OUTPATIENT
Start: 2021-07-16 | End: 2021-07-16 | Stop reason: SURG

## 2021-07-16 RX ORDER — PROMETHAZINE HYDROCHLORIDE 25 MG/1
25 TABLET ORAL ONCE AS NEEDED
Status: DISCONTINUED | OUTPATIENT
Start: 2021-07-16 | End: 2021-07-16 | Stop reason: HOSPADM

## 2021-07-16 RX ORDER — LIDOCAINE HYDROCHLORIDE 20 MG/ML
INJECTION, SOLUTION INTRAVENOUS AS NEEDED
Status: DISCONTINUED | OUTPATIENT
Start: 2021-07-16 | End: 2021-07-16 | Stop reason: SURG

## 2021-07-16 RX ADMIN — PROPOFOL 30 MG: 10 INJECTION, EMULSION INTRAVENOUS at 10:57

## 2021-07-16 RX ADMIN — DEXTROSE AND SODIUM CHLORIDE 30 ML/HR: 5; 450 INJECTION, SOLUTION INTRAVENOUS at 10:21

## 2021-07-16 RX ADMIN — LIDOCAINE HYDROCHLORIDE 40 MG: 20 INJECTION, SOLUTION INTRAVENOUS at 10:49

## 2021-07-16 RX ADMIN — PROPOFOL 30 MG: 10 INJECTION, EMULSION INTRAVENOUS at 11:01

## 2021-07-16 RX ADMIN — PROPOFOL 30 MG: 10 INJECTION, EMULSION INTRAVENOUS at 10:55

## 2021-07-16 RX ADMIN — PROPOFOL 100 MG: 10 INJECTION, EMULSION INTRAVENOUS at 10:49

## 2021-07-16 RX ADMIN — PROPOFOL 30 MG: 10 INJECTION, EMULSION INTRAVENOUS at 10:59

## 2021-07-16 RX ADMIN — PROPOFOL 30 MG: 10 INJECTION, EMULSION INTRAVENOUS at 10:52

## 2021-07-16 NOTE — ANESTHESIA PREPROCEDURE EVALUATION
Anesthesia Evaluation     Patient summary reviewed and Nursing notes reviewed   NPO Solid Status: > 8 hours  NPO Liquid Status: > 2 hours           Airway   Mallampati: III  TM distance: >3 FB  Neck ROM: full  Small opening and Possible difficult intubation  Dental - normal exam     Pulmonary - normal exam   (+) a smoker Current, asthma,sleep apnea on CPAP,   Cardiovascular - normal exam  Exercise tolerance: good (4-7 METS)    NYHA Classification: III    (+) hypertension less than 2 medications, hyperlipidemia,       Neuro/Psych  (+) numbness, psychiatric history Anxiety and Depression,     GI/Hepatic/Renal/Endo    (+)  GERD poorly controlled, GI bleeding ,     Musculoskeletal     (+) neck pain,   Abdominal  - normal exam   Substance History      OB/GYN          Other   arthritis,                    Anesthesia Plan    ASA 3     MAC     intravenous induction     Anesthetic plan, all risks, benefits, and alternatives have been provided, discussed and informed consent has been obtained with: patient.    Plan discussed with CRNA.

## 2021-07-16 NOTE — ANESTHESIA POSTPROCEDURE EVALUATION
Patient: Kalyn Gilliland    Procedure Summary     Date: 07/16/21 Room / Location: [facility] ENDOSCOPY 1 / [facility] ENDOSCOPY    Anesthesia Start: 1033 Anesthesia Stop:     Procedure: COLONOSCOPY ASAP (N/A ) Diagnosis:       Personal history of colonic polyps      Rectal bleeding      (Personal history of colonic polyps [Z86.010])      (Rectal bleeding [K62.5])    Surgeons: Navid Swift MD Provider: Ele Peñaloza CRNA    Anesthesia Type: MAC ASA Status: 3          Anesthesia Type: MAC    Vitals  No vitals data found for the desired time range.          Post Anesthesia Care and Evaluation    Patient location during evaluation: bedside  Patient participation: waiting for patient participation  Level of consciousness: sleepy but conscious  Pain score: 0  Pain management: adequate  Airway patency: patent  Anesthetic complications: No anesthetic complications  PONV Status: none  Cardiovascular status: acceptable  Respiratory status: acceptable  Hydration status: acceptable

## 2021-07-16 NOTE — H&P
No chief complaint on file.      Subjective    Kalyn Gilliland is a 56 y.o. female. she is here today for follow-up.    History of Present Illness  56-year-old female presents to discuss colonoscopy and recent rectal bleeding.  She had colonoscopy in 2019 with repeat recommended in 1 year due to multiple colonic polyps but did not schedule due to Covid pandemic.  Reports she had bright red blood within her stool a few days ago and has felt more tired than usual.  Denies any nausea or vomiting.  She denies any changes in her bowel movement otherwise.  She has family history of colon cancer in her grandmother.  Plan; schedule patient for colonoscopy due to recent episode of rectal bleeding and history of villous adenoma and tubular adenomas at sigmoid colon.     The following portions of the patient's history were reviewed and updated as appropriate:   Past Medical History:   Diagnosis Date   • Arthropathy of lumbar facet joint    • Asthma    • Cervical spondylosis without myelopathy    • Chronic cholecystitis    • Hypertension    • Long term use of drug    • Lumbosacral radiculitis    • Myofascial pain    • Sebaceous cyst    • Trochanteric bursitis      Past Surgical History:   Procedure Laterality Date   • CHOLECYSTECTOMY     • COLONOSCOPY N/A 4/30/2019    Procedure: COLONOSCOPY;  Surgeon: Navid Swift MD;  Location: [facility] ENDOSCOPY;  Service: Gastroenterology   • CYST REMOVAL  10/18/2015    EXC TR-EXT Benign+Hue 2.1-3 CM 39370 (Sebaceous cyst)    • CYSTOCELE REPAIR  10/18/2015    Repair Superficial Wound TR-EXT 2.5 < CM 31138 (Sebaceous cyst)    • ENDOSCOPY N/A 4/30/2019    Procedure: ESOPHAGOGASTRODUODENOSCOPY;  Surgeon: Navid Swift MD;  Location: [facility] ENDOSCOPY;  Service: Gastroenterology   • LUMBAR DISCECTOMY     • TONSILLECTOMY       Family History   Problem Relation Age of Onset   • Cancer Other    • Diabetes Other    • Heart disease Other    • Hyperlipidemia Other    • Hypertension Other    •  Thyroid disease Other    • Other Other         Headaches     OB History    No obstetric history on file.       Prior to Admission medications    Medication Sig Start Date End Date Taking? Authorizing Provider   Calcium Carb-Cholecalciferol 600-1000 MG-UNIT tablet Take  by mouth.   Yes Sanjay Gregory MD   Diclofenac Sodium (VOLTAREN) 1 % gel gel APPLY TO AFFECTED AREA 3 TIMES A DAY 2/24/21  Yes Sanjay Gregory MD   hydrochlorothiazide (MICROZIDE) 12.5 MG capsule Take 12.5 mg by mouth daily.   Yes Sanjay Gregory MD   HYDROcodone-acetaminophen (NORCO) 7.5-325 MG per tablet Take 1 tablet by mouth 2 (Two) Times a Day.   Yes Sanjay Gregory MD   lisinopril (PRINIVIL,ZESTRIL) 20 MG tablet Take 20 mg by mouth daily.   Yes Sanjay Gregory MD   loratadine (CLARITIN) 10 MG tablet Take 10 mg by mouth daily.   Yes Sanjay Gregory MD   sertraline (ZOLOFT) 100 MG tablet Take 100 mg by mouth Daily.   Yes Sanjay Gregory MD   omeprazole (priLOSEC) 40 MG capsule Take 1 capsule by mouth Daily. 7/2/19 6/15/21 Yes Caity Dias APRN   sucralfate (CARAFATE) 1 g tablet Take 1 tablet by mouth 4 (Four) Times a Day. 5/7/19 6/15/21 Yes Caity Dias APRN   citalopram (CeleXA) 40 MG tablet Take 40 mg by mouth daily.  6/15/21  Sanjay Gregory MD   sertraline (ZOLOFT) 100 MG tablet  5/18/21 6/15/21  Sanjay Gregory MD     Allergies   Allergen Reactions   • Erythromycin Base Coated [Erythromycin]      Social History     Socioeconomic History   • Marital status: Significant Other     Spouse name: Not on file   • Number of children: Not on file   • Years of education: Not on file   • Highest education level: Not on file   Tobacco Use   • Smoking status: Current Every Day Smoker     Packs/day: 1.00     Years: 40.00     Pack years: 40.00     Types: Cigarettes   • Smokeless tobacco: Never Used   Vaping Use   • Vaping Use: Some days   • Substances: Nicotine   Substance and Sexual Activity   •  "Alcohol use: Yes     Comment: occasionally a glass of wine   • Drug use: No   • Sexual activity: Defer       Review of Systems  Review of Systems   Constitutional: Negative for activity change, appetite change, chills, diaphoresis, fatigue, fever and unexpected weight change.   HENT: Negative for sore throat and trouble swallowing.    Respiratory: Negative for shortness of breath.    Gastrointestinal: Positive for blood in stool. Negative for abdominal distention, abdominal pain, anal bleeding, constipation, diarrhea, nausea, rectal pain and vomiting.   Musculoskeletal: Negative for arthralgias.   Skin: Negative for pallor.   Neurological: Negative for light-headedness.        /86   Pulse 75   Temp 97.3 °F (36.3 °C)   Resp 18   Ht 162.6 cm (64\")   Wt 94.2 kg (207 lb 9.6 oz)   SpO2 97%   BMI 35.63 kg/m²     Objective    Physical Exam  Constitutional:       General: She is not in acute distress.     Appearance: Normal appearance. She is well-developed.   HENT:      Head: Normocephalic and atraumatic.   Neck:      Thyroid: No thyromegaly.   Cardiovascular:      Rate and Rhythm: Normal rate.   Pulmonary:      Effort: Pulmonary effort is normal.   Abdominal:      General: Bowel sounds are normal. There is no distension.      Palpations: Abdomen is soft. Abdomen is not rigid.      Tenderness: There is no abdominal tenderness. There is no guarding.      Hernia: No hernia is present.   Musculoskeletal:      Cervical back: Normal range of motion and neck supple.   Skin:     General: Skin is warm and dry.      Coloration: Skin is not pale.      Findings: No rash.   Neurological:      Mental Status: She is alert and oriented to person, place, and time.   Psychiatric:         Speech: Speech normal.         Behavior: Behavior is cooperative.       Admission on 04/30/2019, Discharged on 04/30/2019   Component Date Value Ref Range Status   • Case Report 04/30/2019    Final                    Value:Surgical Pathology " Report                         Case: [id number]                                  Authorizing Provider:  Navid Swift MD        Collected:           04/30/2019 02:18 PM          Ordering Location:     [facility]             Received:            05/01/2019 06:34 AM                                 [facility]                                                     Pathologist:           Devyn Barrett MD                                                         Specimens:   1) - Gastric, Antrum, antrum bx                                                                     2) - Esophagus, Distal, distal esophagus bx                                                         3) - Large Intestine, Sigmoid Colon, sigmoid colon polyps X 2 (hot snare)                 • Final Diagnosis 04/30/2019    Final                    Value:This result contains rich text formatting which cannot be displayed here.   • Gross Description 04/30/2019    Final                    Value:This result contains rich text formatting which cannot be displayed here.     Assessment/Plan      1. Personal history of colonic polyps    2. Rectal bleeding    .       Orders placed during this encounter include:  Orders Placed This Encounter   Procedures   • Obtain Informed Consent     Standing Status:   Standing     Number of Occurrences:   1     Order Specific Question:   Informed Consent Given For     Answer:   Colonoscopy   • POC Glucose Once     Prior to Procedure on ALL Diabetic Patients     Standing Status:   Standing     Number of Occurrences:   1   • Insert Peripheral IV     Standing Status:   Standing     Number of Occurrences:   1       COLONOSCOPY ASAP (N/A)    Review and/or summary of lab tests, radiology, procedures, medications. Review and summary of old records and obtaining of history. The risks and benefits of my recommendations, as well as other treatment options were discussed with the patient today. Questions were  answered.    New Medications Ordered This Visit   Medications   • dextrose 5 % and sodium chloride 0.45 % infusion       Follow-up: No follow-ups on file.          This document has been electronically signed by Navid Swift MD on July 16, 2021 10:19 CDT           I spent 24 minutes caring for Kalyn on this date of service. This time includes time spent by me in the following activities:preparing for the visit, reviewing tests, obtaining and/or reviewing a separately obtained history, performing a medically appropriate examination and/or evaluation , counseling and educating the patient/family/caregiver, ordering medications, tests, or procedures, referring and communicating with other health care professionals , documenting information in the medical record and care coordination    Results for orders placed or performed during the hospital encounter of 04/30/19   Tissue Pathology Exam    Specimen: A: Gastric, Antrum; Tissue    B: Esophagus, Distal; Tissue    C: Large Intestine, Sigmoid Colon; Tissue   Result Value Ref Range    Case Report       Surgical Pathology Report                         Case: [id number]                                  Authorizing Provider:  Navid Swift MD        Collected:           04/30/2019 02:18 PM          Ordering Location:     [facility]             Received:            05/01/2019 06:34 AM                                 [facility]                                                     Pathologist:           Devyn Barrett MD                                                         Specimens:   1) - Gastric, Antrum, antrum bx                                                                     2) - Esophagus, Distal, distal esophagus bx                                                         3) - Large Intestine, Sigmoid Colon, sigmoid colon polyps X 2 (hot snare)                  Final Diagnosis       1.  MUCOSA, ANTRUM OF STOMACH:   REACTIVE  "GASTROPATHY.    2.  MUCOSA, DISTAL ESOPHAGUS:   REACTIVE CHANGE OF SQUAMOUS MUCOSA.     3.  POLYPS (2), SIGMOID COLON:   VILLOUS ADENOMA.   TUBULAR ADENOMA.       Gross Description       1.  The first container is labeled \"antrum of stomach\" and has a nodular fragment of white soft tissue measuring 0.4 cm in greatest dimension.  The entire specimen is embedded as 1A.    2.  The second container is labeled \"distal esophagus\" and has nodular bits of white soft tissue measuring 0.4 cc in aggregate.  The entire specimen is embedded as 2A.    3.  The third container is labeled \"polyps (2), sigmoid colon\" and has 2 brown polypoid fragments of tissue measuring 2.2 x 1.3 x 0.9 cm and 0.4 x 0.3 x 0.2 cm.  All specimens are entirely embedded as 3A.      Results for orders placed or performed in visit on 11/07/17   "[employer] Select 13 (MW) - Urine, Clean Catch    Specimen: Urine, Clean Catch   Result Value Ref Range    Report Summary FINAL    Results for orders placed or performed in visit on 06/26/17   "[employer] Select 13 (MW)    Specimen: Urine, Clean Catch   Result Value Ref Range    Report Summary FINAL    Results for orders placed or performed during the hospital encounter of 09/07/16   Pain Management Profile (13 Drugs) Urine    Specimen: Urine   Result Value Ref Range    Pain Management Drug Panel See Below     Codeine, Urine Not Detected     Morphine, Urine Present     6-acetylmorphine Not Detected     Oxycodone Screen, Urine Not Detected     Noroxycodone Not Detected     Oxymorphone UR Not Detected     Noroxymorphone Not Detected     Hydrocodone UR Not Detected     Norhydrocodone Not Detected     Hydromorphone Not Detected     Buprenorphine, Urine Not Detected     Norbuprenorphine Not Detected     Fentanyl, Urine Not Detected     Norfentanyl Not Detected     Normeperidine, Urine Not Detected     Tapentadol Not Detected     Tapentadol-o-Sulf Not Detected     Methadone Screen, Urine Not Detected     Propoxyphene Screen " Not Detected     Tramadol Screen, Urine Not Detected     Amphetamine, Urine Qual Not Detected     Methamphetamine, Urine Not Detected     MDMA-Ecstasy Not Detected     MDA Not Detected     MDEA Not Detected     Phentermine Urine Not Detected     Benzoylecgonine, Urine Not Detected     Alprazolam Urine, Conf Not Detected     alpha-Hydroxyalprazolam, Quant, Urine Not Detected     Clonazepam Not Detected     7-Aminoclonazepam Urine Not Detected     Diazepam Urine, Qualitative Not Detected     Nordiazepam, Urine Not Detected     Oxazepam, urine Not Detected     Temazepam, urine Not Detected     Lorazepam Not Detected     Midazolam, Urine Not Detected     Zolpidem(Ambien) Urine Not Detected     Barbiturates Screen, Urine Not Detected     Ethyl Glucuronide Not Detected     Marijuana Metabolite Not Detected     Phencyclidine (PCP), Urine Not Detected     Carisoprodol, Serum Not Detected     Creatinine, Urine 39.1 20.0 - 400.0 mg/dL    Pain Management Drug Panel See Note     Methylphenidate Not Detected    Results for orders placed or performed during the hospital encounter of 02/17/16   Pain Management Profile (13 Drugs) Urine    Specimen: Urine   Result Value Ref Range    Pain Management Drug Panel See Below     Codeine, Urine Not Detected     Morphine, Urine Present     6-acetylmorphine Not Detected     Oxycodone Screen, Urine Not Detected     Noroxycodone Not Detected     Oxymorphone UR Not Detected     Noroxymorphone Not Detected     Hydrocodone UR Not Detected     Norhydrocodone Not Detected     Hydromorphone Not Detected     Buprenorphine, Urine Not Detected     Norbuprenorphine Not Detected     Fentanyl, Urine Not Detected     Norfentanyl Not Detected     Normeperidine, Urine Not Detected     Tapentadol Not Detected     Tapentadol-o-Sulf Not Detected     Methadone Screen, Urine Not Detected     Propoxyphene Screen Not Detected     Tramadol Screen, Urine Not Detected     Amphetamine, Urine Qual Present      Methamphetamine, Urine Not Detected     MDMA-Ecstasy Not Detected     MDA Not Detected     MDEA Not Detected     Ritalinic Acid, Urine Not Detected     Phentermine Urine Not Detected     Benzoylecgonine, Urine Not Detected     Alprazolam Urine, Conf Not Detected     alpha-Hydroxyalprazolam, Quant, Urine Not Detected     Clonazepam Not Detected     7-Aminoclonazepam Urine Not Detected     Diazepam Urine, Qualitative Not Detected     Nordiazepam, Urine Not Detected     Oxazepam, urine Not Detected     Temazepam, urine Not Detected     Lorazepam Not Detected     Midazolam, Urine Not Detected     Zolpidem(Ambien) Urine Not Detected     Barbiturates Screen, Urine Not Detected     Ethyl Glucuronide Not Detected     Marijuana Metabolite Not Detected     Phencyclidine (PCP), Urine Not Detected     Carisoprodol, Serum Not Detected     Creatinine, Urine 129.9 20.0 - 400.0 mg/dL    Pain Management Drug Panel See Note    Results for orders placed or performed during the hospital encounter of 09/21/15   Converted Surgical Pathology    Specimen: Tissue   Result Value Ref Range    Spec Descr 1 SPECIMEN(S): A LESION, LEFT FOREARM      *Note: Due to a large number of results and/or encounters for the requested time period, some results have not been displayed. A complete set of results can be found in Results Review.

## 2021-07-20 LAB
LAB AP CASE REPORT: NORMAL
PATH REPORT.FINAL DX SPEC: NORMAL

## 2021-07-26 ENCOUNTER — OFFICE VISIT (OUTPATIENT)
Dept: GASTROENTEROLOGY | Facility: CLINIC | Age: 56
End: 2021-07-26

## 2021-07-26 VITALS
HEIGHT: 64 IN | BODY MASS INDEX: 35.48 KG/M2 | HEART RATE: 66 BPM | SYSTOLIC BLOOD PRESSURE: 146 MMHG | DIASTOLIC BLOOD PRESSURE: 82 MMHG | WEIGHT: 207.8 LBS

## 2021-07-26 DIAGNOSIS — K57.90 DIVERTICULOSIS: Primary | ICD-10-CM

## 2021-07-26 PROCEDURE — 99212 OFFICE O/P EST SF 10 MIN: CPT | Performed by: NURSE PRACTITIONER

## 2021-07-26 NOTE — PROGRESS NOTES
Chief Complaint   Patient presents with   • Rectal Bleeding       Subjective    Kalyn Gilliland is a 56 y.o. female. she is here today for follow-up.    History of Present Illness  56-year-old female presents to discuss colonoscopy results.  Reports she has not had any further episodes of rectal bleeding since episode prior to colonoscopy.  Colonoscopy was completed 7/16/2021 noted adequate prep normal perianal digital rectal exam few small mouth diverticula found sigmoid descending colon external and internal hemorrhoids were found several biopsies were obtained which showed no significant histologic abnormality.       The following portions of the patient's history were reviewed and updated as appropriate:   Past Medical History:   Diagnosis Date   • Arthropathy of lumbar facet joint    • Asthma    • Cervical spondylosis without myelopathy    • Chronic cholecystitis    • Hypertension    • Long term use of drug    • Lumbosacral radiculitis    • Myofascial pain    • Sebaceous cyst    • Trochanteric bursitis      Past Surgical History:   Procedure Laterality Date   • CHOLECYSTECTOMY     • COLONOSCOPY N/A 4/30/2019    Procedure: COLONOSCOPY;  Surgeon: Navid Swift MD;  Location: [facility] ENDOSCOPY;  Service: Gastroenterology   • COLONOSCOPY N/A 7/16/2021    Procedure: COLONOSCOPY ASAP;  Surgeon: Navid Swift MD;  Location: [facility] ENDOSCOPY;  Service: Gastroenterology;  Laterality: N/A;   • CYST REMOVAL  10/18/2015    EXC TR-EXT Benign+Hue 2.1-3 CM 45729 (Sebaceous cyst)    • CYSTOCELE REPAIR  10/18/2015    Repair Superficial Wound TR-EXT 2.5 < CM 01949 (Sebaceous cyst)    • ENDOSCOPY N/A 4/30/2019    Procedure: ESOPHAGOGASTRODUODENOSCOPY;  Surgeon: Navid Swift MD;  Location: [facility] ENDOSCOPY;  Service: Gastroenterology   • LUMBAR DISCECTOMY     • TONSILLECTOMY       Family History   Problem Relation Age of Onset   • Cancer Other    • Diabetes Other    • Heart disease Other    • Hyperlipidemia Other    •  Hypertension Other    • Thyroid disease Other    • Other Other         Headaches     OB History    No obstetric history on file.       Prior to Admission medications    Medication Sig Start Date End Date Taking? Authorizing Provider   atorvastatin (LIPITOR) 40 MG tablet Take 40 mg by mouth Daily.   Yes Sanjay Gregory MD   Calcium Carb-Cholecalciferol 600-1000 MG-UNIT tablet Take  by mouth.   Yes Sanjay Gregory MD   Diclofenac Sodium (VOLTAREN) 1 % gel gel APPLY TO AFFECTED AREA 3 TIMES A DAY 2/24/21  Yes Sanjay Gregory MD   hydrochlorothiazide (MICROZIDE) 12.5 MG capsule Take 12.5 mg by mouth daily.   Yes Sanjay Gregory MD   HYDROcodone-acetaminophen (NORCO) 7.5-325 MG per tablet Take 1 tablet by mouth 3 (Three) Times a Day.   Yes Sanjay Gregory MD   lisinopril (PRINIVIL,ZESTRIL) 20 MG tablet Take 20 mg by mouth daily.   Yes Sanjay Gregory MD   loratadine (CLARITIN) 10 MG tablet Take 10 mg by mouth daily.   Yes Sanjay Gergory MD   sertraline (ZOLOFT) 100 MG tablet Take 100 mg by mouth Daily.   Yes Sanjay Gregory MD     Allergies   Allergen Reactions   • Erythromycin Base Coated [Erythromycin]      Social History     Socioeconomic History   • Marital status: Significant Other     Spouse name: Not on file   • Number of children: Not on file   • Years of education: Not on file   • Highest education level: Not on file   Tobacco Use   • Smoking status: Current Every Day Smoker     Packs/day: 1.00     Years: 40.00     Pack years: 40.00     Types: Cigarettes   • Smokeless tobacco: Never Used   Vaping Use   • Vaping Use: Some days   • Substances: Nicotine   Substance and Sexual Activity   • Alcohol use: Yes     Comment: occasionally a glass of wine   • Drug use: No   • Sexual activity: Defer       Review of Systems  Review of Systems   Constitutional: Negative for activity change, appetite change, chills, diaphoresis, fatigue, fever and unexpected weight change.  "  HENT: Negative for sore throat and trouble swallowing.    Respiratory: Negative for shortness of breath.    Gastrointestinal: Positive for constipation. Negative for abdominal distention, abdominal pain, anal bleeding, blood in stool, diarrhea, nausea, rectal pain and vomiting.   Musculoskeletal: Negative for arthralgias.   Skin: Negative for pallor.   Neurological: Negative for light-headedness.        /82 (BP Location: Left arm)   Pulse 66   Ht 162.6 cm (64\")   Wt 94.3 kg (207 lb 12.8 oz)   BMI 35.67 kg/m²     Objective    Physical Exam  Constitutional:       Appearance: Normal appearance. She is normal weight. She is not ill-appearing.   Pulmonary:      Effort: Pulmonary effort is normal.   Abdominal:      General: Bowel sounds are normal. There is no distension.      Palpations: Abdomen is soft.      Tenderness: There is no abdominal tenderness.   Neurological:      Mental Status: She is alert.       Admission on 07/16/2021, Discharged on 07/16/2021   Component Date Value Ref Range Status   • Case Report 07/16/2021    Final                    Value:Surgical Pathology Report                         Case: [id number]                                  Authorizing Provider:  Navid Swift MD        Collected:           07/16/2021 11:03 AM          Ordering Location:     [facility]             Received:            07/16/2021 12:07 PM                                 [facility]                                                     Pathologist:           Norman Burns MD                                                           Specimen:    Large Intestine, colonic mucosa   cold bx                                                 • Final Diagnosis 07/16/2021    Final                    Value:This result contains rich text formatting which cannot be displayed here.     Assessment/Plan      1. Diverticulosis    .   Fiber supplement daily repeat colonoscopy in 5 years for " surveillance    Orders placed during this encounter include:  No orders of the defined types were placed in this encounter.      * Surgery not found *    Review and/or summary of lab tests, radiology, procedures, medications. Review and summary of old records and obtaining of history. The risks and benefits of my recommendations, as well as other treatment options were discussed with the patient today. Questions were answered.    No orders of the defined types were placed in this encounter.      Follow-up: Return if symptoms worsen or fail to improve.          This document has been electronically signed by YEIMY Bateman on July 26, 2021 11:27 CDT           I spent 15  minutes caring for Kalyn on this date of service. This time includes time spent by me in the following activities:preparing for the visit, reviewing tests, obtaining and/or reviewing a separately obtained history, performing a medically appropriate examination and/or evaluation , counseling and educating the patient/family/caregiver, ordering medications, tests, or procedures, referring and communicating with other health care professionals , documenting information in the medical record and care coordination    Results for orders placed or performed during the hospital encounter of 07/16/21   Tissue Pathology Exam    Specimen: Large Intestine; Tissue   Result Value Ref Range    Case Report       Surgical Pathology Report                         Case: [id number]                                  Authorizing Provider:  Navid Swift MD        Collected:           07/16/2021 11:03 AM          Ordering Location:     [facility]             Received:            07/16/2021 12:07 PM                                 [facility]                                                     Pathologist:           Norman Burns MD                                                           Specimen:    Large Intestine, colonic mucosa   cold bx         "                                          Final Diagnosis       See Scanned Report       Results for orders placed or performed during the hospital encounter of 04/30/19   Tissue Pathology Exam    Specimen: A: Gastric, Antrum; Tissue    B: Esophagus, Distal; Tissue    C: Large Intestine, Sigmoid Colon; Tissue   Result Value Ref Range    Case Report       Surgical Pathology Report                         Case: [id number]                                  Authorizing Provider:  Navid Swift MD        Collected:           04/30/2019 02:18 PM          Ordering Location:     [facility]             Received:            05/01/2019 06:34 AM                                 [facility]                                                     Pathologist:           Devyn Barrett MD                                                         Specimens:   1) - Gastric, Antrum, antrum bx                                                                     2) - Esophagus, Distal, distal esophagus bx                                                         3) - Large Intestine, Sigmoid Colon, sigmoid colon polyps X 2 (hot snare)                  Final Diagnosis       1.  MUCOSA, ANTRUM OF STOMACH:   REACTIVE GASTROPATHY.    2.  MUCOSA, DISTAL ESOPHAGUS:   REACTIVE CHANGE OF SQUAMOUS MUCOSA.     3.  POLYPS (2), SIGMOID COLON:   VILLOUS ADENOMA.   TUBULAR ADENOMA.       Gross Description       1.  The first container is labeled \"antrum of stomach\" and has a nodular fragment of white soft tissue measuring 0.4 cm in greatest dimension.  The entire specimen is embedded as 1A.    2.  The second container is labeled \"distal esophagus\" and has nodular bits of white soft tissue measuring 0.4 cc in aggregate.  The entire specimen is embedded as 2A.    3.  The third container is labeled \"polyps (2), sigmoid colon\" and has 2 brown polypoid fragments of tissue measuring 2.2 x 1.3 x 0.9 cm and 0.4 x 0.3 x 0.2 cm.  All specimens are " entirely embedded as 3A.      Results for orders placed or performed in visit on 11/07/17   ToxASSURE Select 13 (MW) - Urine, Clean Catch    Specimen: Urine, Clean Catch   Result Value Ref Range    Report Summary FINAL    Results for orders placed or performed in visit on 06/26/17   ToxASSURE Select 13 (MW)    Specimen: Urine, Clean Catch   Result Value Ref Range    Report Summary FINAL    Results for orders placed or performed during the hospital encounter of 09/07/16   Pain Management Profile (13 Drugs) Urine    Specimen: Urine   Result Value Ref Range    Pain Management Drug Panel See Below     Codeine, Urine Not Detected     Morphine, Urine Present     6-acetylmorphine Not Detected     Oxycodone Screen, Urine Not Detected     Noroxycodone Not Detected     Oxymorphone UR Not Detected     Noroxymorphone Not Detected     Hydrocodone UR Not Detected     Norhydrocodone Not Detected     Hydromorphone Not Detected     Buprenorphine, Urine Not Detected     Norbuprenorphine Not Detected     Fentanyl, Urine Not Detected     Norfentanyl Not Detected     Normeperidine, Urine Not Detected     Tapentadol Not Detected     Tapentadol-o-Sulf Not Detected     Methadone Screen, Urine Not Detected     Propoxyphene Screen Not Detected     Tramadol Screen, Urine Not Detected     Amphetamine, Urine Qual Not Detected     Methamphetamine, Urine Not Detected     MDMA-Ecstasy Not Detected     MDA Not Detected     MDEA Not Detected     Phentermine Urine Not Detected     Benzoylecgonine, Urine Not Detected     Alprazolam Urine, Conf Not Detected     alpha-Hydroxyalprazolam, Quant, Urine Not Detected     Clonazepam Not Detected     7-Aminoclonazepam Urine Not Detected     Diazepam Urine, Qualitative Not Detected     Nordiazepam, Urine Not Detected     Oxazepam, urine Not Detected     Temazepam, urine Not Detected     Lorazepam Not Detected     Midazolam, Urine Not Detected     Zolpidem(Ambien) Urine Not Detected     Barbiturates Screen,  Urine Not Detected     Ethyl Glucuronide Not Detected     Marijuana Metabolite Not Detected     Phencyclidine (PCP), Urine Not Detected     Carisoprodol, Serum Not Detected     Creatinine, Urine 39.1 20.0 - 400.0 mg/dL    Pain Management Drug Panel See Note     Methylphenidate Not Detected    Results for orders placed or performed during the hospital encounter of 02/17/16   Pain Management Profile (13 Drugs) Urine    Specimen: Urine   Result Value Ref Range    Pain Management Drug Panel See Below     Codeine, Urine Not Detected     Morphine, Urine Present     6-acetylmorphine Not Detected     Oxycodone Screen, Urine Not Detected     Noroxycodone Not Detected     Oxymorphone UR Not Detected     Noroxymorphone Not Detected     Hydrocodone UR Not Detected     Norhydrocodone Not Detected     Hydromorphone Not Detected     Buprenorphine, Urine Not Detected     Norbuprenorphine Not Detected     Fentanyl, Urine Not Detected     Norfentanyl Not Detected     Normeperidine, Urine Not Detected     Tapentadol Not Detected     Tapentadol-o-Sulf Not Detected     Methadone Screen, Urine Not Detected     Propoxyphene Screen Not Detected     Tramadol Screen, Urine Not Detected     Amphetamine, Urine Qual Present     Methamphetamine, Urine Not Detected     MDMA-Ecstasy Not Detected     MDA Not Detected     MDEA Not Detected     Ritalinic Acid, Urine Not Detected     Phentermine Urine Not Detected     Benzoylecgonine, Urine Not Detected     Alprazolam Urine, Conf Not Detected     alpha-Hydroxyalprazolam, Quant, Urine Not Detected     Clonazepam Not Detected     7-Aminoclonazepam Urine Not Detected     Diazepam Urine, Qualitative Not Detected     Nordiazepam, Urine Not Detected     Oxazepam, urine Not Detected     Temazepam, urine Not Detected     Lorazepam Not Detected     Midazolam, Urine Not Detected     Zolpidem(Ambien) Urine Not Detected     Barbiturates Screen, Urine Not Detected     Ethyl Glucuronide Not Detected     Marijuana  Metabolite Not Detected     Phencyclidine (PCP), Urine Not Detected     Carisoprodol, Serum Not Detected     Creatinine, Urine 129.9 20.0 - 400.0 mg/dL    Pain Management Drug Panel See Note      *Note: Due to a large number of results and/or encounters for the requested time period, some results have not been displayed. A complete set of results can be found in Results Review.

## 2021-07-26 NOTE — PATIENT INSTRUCTIONS
Diverticulosis    Diverticulosis is a condition that develops when small pouches (diverticula) form in the wall of the large intestine (colon). The colon is where water is absorbed and stool (feces) is formed. The pouches form when the inside layer of the colon pushes through weak spots in the outer layers of the colon. You may have a few pouches or many of them.  The pouches usually do not cause problems unless they become inflamed or infected. When this happens, the condition is called diverticulitis.  What are the causes?  The cause of this condition is not known.  What increases the risk?  The following factors may make you more likely to develop this condition:  · Being older than age 60. Your risk for this condition increases with age. Diverticulosis is rare among people younger than age 30. By age 80, many people have it.  · Eating a low-fiber diet.  · Having frequent constipation.  · Being overweight.  · Not getting enough exercise.  · Smoking.  · Taking over-the-counter pain medicines, like aspirin and ibuprofen.  · Having a family history of diverticulosis.  What are the signs or symptoms?  In most people, there are no symptoms of this condition. If you do have symptoms, they may include:  · Bloating.  · Cramps in the abdomen.  · Constipation or diarrhea.  · Pain in the lower left side of the abdomen.  How is this diagnosed?  Because diverticulosis usually has no symptoms, it is most often diagnosed during an exam for other colon problems. The condition may be diagnosed by:  · Using a flexible scope to examine the colon (colonoscopy).  · Taking an X-ray of the colon after dye has been put into the colon (barium enema).  · Having a CT scan.  How is this treated?  You may not need treatment for this condition. Your health care provider may recommend treatment to prevent problems. You may need treatment if you have symptoms or if you previously had diverticulitis. Treatment may include:  · Eating a high-fiber  diet.  · Taking a fiber supplement.  · Taking a live bacteria supplement (probiotic).  · Taking medicine to relax your colon.  Follow these instructions at home:  Medicines  · Take over-the-counter and prescription medicines only as told by your health care provider.  · If told by your health care provider, take a fiber supplement or probiotic.  Constipation prevention  Your condition may cause constipation. To prevent or treat constipation, you may need to:  · Drink enough fluid to keep your urine pale yellow.  · Take over-the-counter or prescription medicines.  · Eat foods that are high in fiber, such as beans, whole grains, and fresh fruits and vegetables.  · Limit foods that are high in fat and processed sugars, such as fried or sweet foods.    General instructions  · Try not to strain when you have a bowel movement.  · Keep all follow-up visits as told by your health care provider. This is important.  Contact a health care provider if you:  · Have pain in your abdomen.  · Have bloating.  · Have cramps.  · Have not had a bowel movement in 3 days.  Get help right away if:  · Your pain gets worse.  · Your bloating becomes very bad.  · You have a fever or chills, and your symptoms suddenly get worse.  · You vomit.  · You have bowel movements that are bloody or black.  · You have bleeding from your rectum.  Summary  · Diverticulosis is a condition that develops when small pouches (diverticula) form in the wall of the large intestine (colon).  · You may have a few pouches or many of them.  · This condition is most often diagnosed during an exam for other colon problems.  · Treatment may include increasing the fiber in your diet, taking supplements, or taking medicines.  This information is not intended to replace advice given to you by your health care provider. Make sure you discuss any questions you have with your health care provider.  Document Revised: 07/16/2020 Document Reviewed: 07/16/2020  [employer] Patient  Education © 2021 [employer]

## (undated) DEVICE — PAD GRND REM POLYHESIVE A/ DISP

## (undated) DEVICE — Device: Brand: DISPOSABLE ELECTROSURGICAL SNARE

## (undated) DEVICE — TRAP SXN POLYP QUICKCATCH LF

## (undated) DEVICE — BITEBLOCK ENDO W/STRAP 60F A/ LF DISP

## (undated) DEVICE — SINGLE-USE BIOPSY FORCEPS: Brand: RADIAL JAW 4